# Patient Record
Sex: MALE | Race: BLACK OR AFRICAN AMERICAN | NOT HISPANIC OR LATINO | Employment: OTHER | URBAN - METROPOLITAN AREA
[De-identification: names, ages, dates, MRNs, and addresses within clinical notes are randomized per-mention and may not be internally consistent; named-entity substitution may affect disease eponyms.]

---

## 2017-05-20 ENCOUNTER — OFFICE VISIT (OUTPATIENT)
Dept: INTERNAL MEDICINE | Facility: CLINIC | Age: 42
End: 2017-05-20
Payer: COMMERCIAL

## 2017-05-20 VITALS
WEIGHT: 187.38 LBS | SYSTOLIC BLOOD PRESSURE: 110 MMHG | DIASTOLIC BLOOD PRESSURE: 80 MMHG | BODY MASS INDEX: 26.82 KG/M2 | HEART RATE: 85 BPM | HEIGHT: 70 IN | OXYGEN SATURATION: 98 %

## 2017-05-20 DIAGNOSIS — L03.811 CELLULITIS OF HEAD EXCEPT FACE: Primary | ICD-10-CM

## 2017-05-20 PROCEDURE — 99214 OFFICE O/P EST MOD 30 MIN: CPT | Mod: S$GLB,,, | Performed by: FAMILY MEDICINE

## 2017-05-20 PROCEDURE — 1160F RVW MEDS BY RX/DR IN RCRD: CPT | Mod: S$GLB,,, | Performed by: FAMILY MEDICINE

## 2017-05-20 PROCEDURE — 99999 PR PBB SHADOW E&M-EST. PATIENT-LVL III: CPT | Mod: PBBFAC,,, | Performed by: FAMILY MEDICINE

## 2017-05-20 RX ORDER — MUPIROCIN 20 MG/G
OINTMENT TOPICAL 3 TIMES DAILY
Qty: 30 G | Refills: 3 | Status: SHIPPED | OUTPATIENT
Start: 2017-05-20 | End: 2020-01-09

## 2017-05-20 RX ORDER — DOXYCYCLINE HYCLATE 100 MG
100 TABLET ORAL 2 TIMES DAILY
Qty: 20 TABLET | Refills: 1 | Status: SHIPPED | OUTPATIENT
Start: 2017-05-20 | End: 2017-05-30 | Stop reason: SDUPTHER

## 2017-05-20 NOTE — PROGRESS NOTES
Subjective:       Patient ID: Josh Jimenez Jr. is a 41 y.o. male.    Chief Complaint: Follow-up ([facility] health care)  Josh Jimenez Jr. 41 y.o. male is here for office visit to review care and physical exam, reports having scalp lesions that recur.  Has seen ID, has used Doxycycline which worked.  Had steroid cream prescribed also.  Hard to know if has Seborrheic Derm also.  Has flaking at times, not presently noted.      HPI  Review of Systems   Constitutional: Negative for activity change, appetite change, fatigue, fever and unexpected weight change.   HENT: Negative for congestion, hearing loss, postnasal drip and rhinorrhea.    Eyes: Negative for pain, discharge and visual disturbance.   Respiratory: Negative for cough, choking and shortness of breath.    Cardiovascular: Negative for chest pain, palpitations and leg swelling.   Gastrointestinal: Negative for abdominal pain, diarrhea and vomiting.   Genitourinary: Negative for dysuria, flank pain, hematuria and urgency.   Musculoskeletal: Negative for arthralgias, back pain, joint swelling and neck pain.   Skin: Positive for rash. Negative for color change.   Neurological: Negative for dizziness, tremors, syncope, weakness, numbness and headaches.   Psychiatric/Behavioral: Negative for agitation and confusion. The patient is not hyperactive.        Objective:      Physical Exam   Constitutional: He is oriented to person, place, and time. He appears well-developed and well-nourished.   HENT:   Head: Normocephalic.   Eyes: EOM are normal. Pupils are equal, round, and reactive to light.   Neck: Normal range of motion. Neck supple. No thyromegaly present.   Cardiovascular: Normal rate.  Exam reveals no gallop and no friction rub.    No murmur heard.  Pulmonary/Chest: Effort normal. No respiratory distress. He has no wheezes.   Abdominal: Soft. Bowel sounds are normal. He exhibits no mass. There is no tenderness.   Musculoskeletal: He exhibits no edema or  tenderness.   Lymphadenopathy:     He has no cervical adenopathy.   Neurological: He is alert and oriented to person, place, and time. He has normal reflexes. No cranial nerve deficit.   Skin: Skin is warm. No rash noted.   Papular confluent area scalp with hair loss, slight fluctuance noted.   Psychiatric: He has a normal mood and affect. His behavior is normal.       Assessment:       No diagnosis found.    Plan:       Josh was seen today for follow-up.    Diagnoses and all orders for this visit:    Cellulitis of head except face  -     doxycycline (VIBRA-TABS) 100 MG tablet; Take 1 tablet (100 mg total) by mouth 2 (two) times daily.  -     mupirocin (BACTROBAN) 2 % ointment; Apply topically 3 (three) times daily.

## 2017-05-20 NOTE — MR AVS SNAPSHOT
[facility]  [address]  [facility] [address]  Phone: [phone]  Fax: [phone]                  Josh Jimenez Jr.   2017 8:00 AM   Office Visit    Description:  Male : 1975   Provider:  Fabrice Yang MD   Department:  [facility]           Reason for Visit     Follow-up           Diagnoses this Visit        Comments    Cellulitis of head except face    -  Primary            To Do List           Future Appointments        Provider Department Dept Phone    2017 9:30 AM Kuldip Granados MD [facility] Infectious Diseases [phone]      Goals (5 Years of Data)     None      Follow-Up and Disposition     Return in about 6 months (around 2017), or if symptoms worsen or fail to improve.    Follow-up and Disposition History       These Medications        Disp Refills Start End    doxycycline (VIBRA-TABS) 100 MG tablet 20 tablet 1 2017    Take 1 tablet (100 mg total) by mouth 2 (two) times daily. - Oral    Pharmacy: [address] Ph #: [phone]       mupirocin (BACTROBAN) 2 % ointment 30 g 3 2017     Apply topically 3 (three) times daily. - Topical (Top)    Pharmacy: [address] Ph #: [phone]         [facility]     [facility] Care [facility] -  Assistance  Unless otherwise directed by your provider, please contact [facility], our nurse care line that is available for  assistance.     Registered nurses in the [facility] provide: appointment scheduling, clinical advisement, health education, and other advisory services.  Call: [phone] (toll free)               Medications           Message regarding Medications     Verify the changes and/or additions to your medication regime listed below are the same as discussed with your clinician today.  If any of these changes or additions are incorrect, please  "notify your healthcare provider.        START taking these NEW medications        Refills    doxycycline (VIBRA-TABS) 100 MG tablet 1    Sig: Take 1 tablet (100 mg total) by mouth 2 (two) times daily.    Class: Normal    Route: Oral    mupirocin (BACTROBAN) 2 % ointment 3    Sig: Apply topically 3 (three) times daily.    Class: Normal    Route: Topical (Top)           Verify that the below list of medications is an accurate representation of the medications you are currently taking.  If none reported, the list may be blank. If incorrect, please contact your healthcare provider. Carry this list with you in case of emergency.           Current Medications     clobetasol 0.05% (TEMOVATE) 0.05 % Oint Apply topically 2 (two) times daily.    doxycycline (VIBRA-TABS) 100 MG tablet Take 1 tablet (100 mg total) by mouth 2 (two) times daily.    mupirocin (BACTROBAN) 2 % ointment Apply topically 3 (three) times daily.           Clinical Reference Information           Your Vitals Were     BP Pulse Height Weight SpO2 BMI    110/80 (BP Location: Right arm, Patient Position: Sitting, BP Method: Manual) 85 5' 10" (1.778 m) 85 kg (187 lb 6.3 oz) 98% 26.89 kg/m2      Blood Pressure          Most Recent Value    BP  110/80      Allergies as of 5/20/2017     No Known Allergies      Immunizations Administered on Date of Encounter - 5/20/2017     None      Language Assistance Services     ATTENTION: Language assistance services are available, free of charge. Please call [phone].      ATENCIÓN: Si habla español, tiene a washburn disposición servicios gratuitos de asistencia lingüística. Llame al [phone].     [facility] Ý: N?u b?n nói Ti?ng Vi?t, có các d?ch v? h? tr? ngôn ng? mi?n phí dành cho b?n. G?i s? [phone].         [facility] complies with applicable Federal civil rights laws and does not discriminate on the basis of race, color, national origin, age, disability, or sex.        "

## 2017-05-30 ENCOUNTER — PATIENT MESSAGE (OUTPATIENT)
Dept: INTERNAL MEDICINE | Facility: CLINIC | Age: 42
End: 2017-05-30

## 2017-05-30 DIAGNOSIS — L03.811 CELLULITIS OF HEAD EXCEPT FACE: ICD-10-CM

## 2017-05-30 RX ORDER — DOXYCYCLINE HYCLATE 100 MG
TABLET ORAL
Qty: 20 TABLET | Refills: 0 | Status: SHIPPED | OUTPATIENT
Start: 2017-05-30 | End: 2017-06-22 | Stop reason: SDUPTHER

## 2017-06-21 ENCOUNTER — PATIENT MESSAGE (OUTPATIENT)
Dept: INTERNAL MEDICINE | Facility: CLINIC | Age: 42
End: 2017-06-21

## 2017-06-21 DIAGNOSIS — L03.811 CELLULITIS OF HEAD EXCEPT FACE: ICD-10-CM

## 2017-06-22 RX ORDER — DOXYCYCLINE HYCLATE 100 MG
100 TABLET ORAL 2 TIMES DAILY
Qty: 20 TABLET | Refills: 0 | Status: SHIPPED | OUTPATIENT
Start: 2017-06-22 | End: 2020-01-09

## 2018-11-19 ENCOUNTER — PATIENT MESSAGE (OUTPATIENT)
Dept: INTERNAL MEDICINE | Facility: CLINIC | Age: 43
End: 2018-11-19

## 2020-01-09 ENCOUNTER — OFFICE VISIT (OUTPATIENT)
Dept: INTERNAL MEDICINE | Facility: CLINIC | Age: 45
End: 2020-01-09
Payer: COMMERCIAL

## 2020-01-09 VITALS
OXYGEN SATURATION: 97 % | WEIGHT: 190.25 LBS | HEART RATE: 87 BPM | BODY MASS INDEX: 27.24 KG/M2 | SYSTOLIC BLOOD PRESSURE: 112 MMHG | HEIGHT: 70 IN | DIASTOLIC BLOOD PRESSURE: 78 MMHG

## 2020-01-09 DIAGNOSIS — Z00.00 ANNUAL PHYSICAL EXAM: ICD-10-CM

## 2020-01-09 DIAGNOSIS — M67.431 GANGLION OF RIGHT WRIST: ICD-10-CM

## 2020-01-09 DIAGNOSIS — Z76.89 ENCOUNTER TO ESTABLISH CARE WITH NEW DOCTOR: Primary | ICD-10-CM

## 2020-01-09 DIAGNOSIS — M79.645 THUMB PAIN, LEFT: ICD-10-CM

## 2020-01-09 PROCEDURE — 99999 PR PBB SHADOW E&M-EST. PATIENT-LVL IV: ICD-10-PCS | Mod: PBBFAC,,, | Performed by: FAMILY MEDICINE

## 2020-01-09 PROCEDURE — 99999 PR PBB SHADOW E&M-EST. PATIENT-LVL IV: CPT | Mod: PBBFAC,,, | Performed by: FAMILY MEDICINE

## 2020-01-09 PROCEDURE — 99214 PR OFFICE/OUTPT VISIT, EST, LEVL IV, 30-39 MIN: ICD-10-PCS | Mod: S$GLB,,, | Performed by: FAMILY MEDICINE

## 2020-01-09 PROCEDURE — 3008F BODY MASS INDEX DOCD: CPT | Mod: CPTII,S$GLB,, | Performed by: FAMILY MEDICINE

## 2020-01-09 PROCEDURE — 99214 OFFICE O/P EST MOD 30 MIN: CPT | Mod: S$GLB,,, | Performed by: FAMILY MEDICINE

## 2020-01-09 PROCEDURE — 3008F PR BODY MASS INDEX (BMI) DOCUMENTED: ICD-10-PCS | Mod: CPTII,S$GLB,, | Performed by: FAMILY MEDICINE

## 2020-01-09 NOTE — PROGRESS NOTES
Subjective:       Patient ID: Josh Jimenez Jr. is a 44 y.o. male.    Chief Complaint: Establish Care    HPI    44yoM to [facility] care. Former PCP Dr. Yang.    Tender nodule on patient's right wrist. Initial onset after trauma when patient accidentally jammed wrist into friend's shoulder months prior. Tender nodule on dorsal wrist that bothers him when palpating but also when throwing football and during activity (coaches youth sports). Right hand dominant. Denies other numbness/tingling, joint pain.     Review of Systems   Constitutional: Negative for activity change, fatigue, fever and unexpected weight change.   HENT: Negative for ear pain, hearing loss, rhinorrhea, sinus pain, sore throat and trouble swallowing.    Eyes: Negative for discharge and visual disturbance.   Respiratory: Negative for cough, chest tightness, shortness of breath and wheezing.    Cardiovascular: Negative for chest pain and palpitations.   Gastrointestinal: Negative for abdominal pain, blood in stool, constipation, diarrhea, nausea and vomiting.   Endocrine: Negative for polydipsia and polyuria.   Genitourinary: Negative for difficulty urinating, dysuria, hematuria and urgency.   Musculoskeletal: Positive for arthralgias. Negative for back pain, joint swelling and neck pain.   Skin: Negative for rash.   Neurological: Negative for weakness, numbness and headaches.   Psychiatric/Behavioral: Negative for confusion and dysphoric mood.         History reviewed. No pertinent past medical history.     Prior to Admission medications    Medication Sig Start Date End Date Taking? Authorizing Provider   clobetasol 0.05% (TEMOVATE) 0.05 % Oint Apply topically 2 (two) times daily.  Patient not taking: Reported on 1/9/2020 1/13/16 5/20/17  Kuldip Granados MD   doxycycline (VIBRA-TABS) 100 MG tablet Take 1 tablet (100 mg total) by mouth 2 (two) times daily. 6/22/17   Fabrice Yang MD   mupirocin (BACTROBAN) 2 % ointment Apply topically 3 (three) times  "daily. 5/20/17   Fabrice Yang MD        Past medical history, surgical history, and family medical history reviewed and updated as appropriate.    Medications and allergies reviewed.     Objective:          Vitals:    01/09/20 0902   BP: 112/78   BP Location: Right arm   Patient Position: Sitting   BP Method: Large (Manual)   Pulse: 87   SpO2: 97%   Weight: 86.3 kg (190 lb 4.1 oz)   Height: 5' 10" (1.778 m)     Body mass index is 27.3 kg/m².  Physical Exam   Constitutional: He is oriented to person, place, and time. He appears well-developed and well-nourished.   Eyes: EOM are normal.   Cardiovascular: Normal rate, regular rhythm and normal heart sounds.   No murmur heard.  Pulmonary/Chest: Effort normal and breath sounds normal. No respiratory distress. He has no wheezes.   Abdominal: Soft.   Musculoskeletal:        Right wrist: He exhibits tenderness.   Ganglion cyst, tender nodule, mobile   Neurological: He is alert and oriented to person, place, and time.       Lab Results   Component Value Date    WBC 6.64 03/31/2015    HGB 15.3 03/31/2015    HCT 45.1 03/31/2015     03/31/2015    CHOL 233 (H) 08/21/2016    TRIG 115 08/21/2016    HDL 37 (L) 08/21/2016    ALT 15 08/21/2016    AST 16 08/21/2016     08/21/2016    K 4.6 08/21/2016     08/21/2016    CREATININE 1.2 08/21/2016    BUN 14 08/21/2016    CO2 24 08/21/2016    HGBA1C 5.3 03/31/2015       Assessment:       1. Encounter to establish care with new doctor    2. Annual physical exam    3. Ganglion of right wrist    4. Thumb pain, left        Plan:   Josh was seen today for establish care.    Diagnoses and all orders for this visit:    Discussed several possibility incl conservative vs. More aggressive mgmt. S/t patient's pain with activity, patient wants addressed. Will order u/s today but also referring for further mgmt incl possible injection/aspiration vs. Complete cyst removal. Discussed with pt that if cyst, high possibility of " recurrence with any procedure. nsaids prn pain/inflammation, rtc prn    Encounter to establish care with new doctor    Annual physical exam    Ganglion of right wrist  -     US Soft Tissue Misc; Future  -     Ambulatory referral to Hand Surgery    Thumb pain, left  -     US Soft Tissue Misc; Future  -     Ambulatory referral to Hand Surgery      Follow up in about 1 year (around 1/9/2021).    Rakesh Dent MD  Family Medicine  [facility]  1/9/2020

## 2020-01-16 ENCOUNTER — HOSPITAL ENCOUNTER (OUTPATIENT)
Dept: RADIOLOGY | Facility: OTHER | Age: 45
Discharge: HOME | End: 2020-01-16
Attending: FAMILY MEDICINE
Payer: COMMERCIAL

## 2020-01-16 DIAGNOSIS — M79.645 THUMB PAIN, LEFT: ICD-10-CM

## 2020-01-16 DIAGNOSIS — M67.431 GANGLION OF RIGHT WRIST: ICD-10-CM

## 2020-01-16 PROCEDURE — 76882 PR  US,EXTREMITY,NONVASCULAR,REAL-TIME IMAGE,LIMITED: ICD-10-PCS | Mod: 26,RT,, | Performed by: RADIOLOGY

## 2020-01-16 PROCEDURE — 76999 ECHO EXAMINATION PROCEDURE: CPT | Mod: TC

## 2020-01-16 PROCEDURE — 76882 US LMTD JT/FCL EVL NVASC XTR: CPT | Mod: 26,RT,, | Performed by: RADIOLOGY

## 2020-01-29 ENCOUNTER — OFFICE VISIT (OUTPATIENT)
Dept: ORTHOPEDICS | Facility: CLINIC | Age: 45
End: 2020-01-29
Payer: COMMERCIAL

## 2020-01-29 ENCOUNTER — HOSPITAL ENCOUNTER (OUTPATIENT)
Dept: RADIOLOGY | Facility: OTHER | Age: 45
Discharge: HOME | End: 2020-01-29
Attending: PHYSICIAN ASSISTANT
Payer: COMMERCIAL

## 2020-01-29 VITALS
SYSTOLIC BLOOD PRESSURE: 125 MMHG | BODY MASS INDEX: 27.24 KG/M2 | DIASTOLIC BLOOD PRESSURE: 87 MMHG | HEIGHT: 70 IN | WEIGHT: 190.25 LBS | HEART RATE: 76 BPM

## 2020-01-29 DIAGNOSIS — M67.431 GANGLION CYST OF DORSUM OF RIGHT WRIST: ICD-10-CM

## 2020-01-29 DIAGNOSIS — R20.0 BILATERAL HAND NUMBNESS: ICD-10-CM

## 2020-01-29 DIAGNOSIS — M25.562 PAIN IN BOTH KNEES, UNSPECIFIED CHRONICITY: ICD-10-CM

## 2020-01-29 DIAGNOSIS — M25.561 PAIN IN BOTH KNEES, UNSPECIFIED CHRONICITY: ICD-10-CM

## 2020-01-29 DIAGNOSIS — M67.431 GANGLION CYST OF DORSUM OF RIGHT WRIST: Primary | ICD-10-CM

## 2020-01-29 PROCEDURE — 99999 PR PBB SHADOW E&M-EST. PATIENT-LVL III: ICD-10-PCS | Mod: PBBFAC,,, | Performed by: PHYSICIAN ASSISTANT

## 2020-01-29 PROCEDURE — 99204 OFFICE O/P NEW MOD 45 MIN: CPT | Mod: S$GLB,,, | Performed by: PHYSICIAN ASSISTANT

## 2020-01-29 PROCEDURE — 73130 X-RAY EXAM OF HAND: CPT | Mod: TC,50,FY

## 2020-01-29 PROCEDURE — 73130 X-RAY EXAM OF HAND: CPT | Mod: 26,,, | Performed by: RADIOLOGY

## 2020-01-29 PROCEDURE — 73130 XR HAND COMPLETE 3 VIEWS BILATERAL: ICD-10-PCS | Mod: 26,,, | Performed by: RADIOLOGY

## 2020-01-29 PROCEDURE — 99204 PR OFFICE/OUTPT VISIT, NEW, LEVL IV, 45-59 MIN: ICD-10-PCS | Mod: S$GLB,,, | Performed by: PHYSICIAN ASSISTANT

## 2020-01-29 PROCEDURE — 3008F PR BODY MASS INDEX (BMI) DOCUMENTED: ICD-10-PCS | Mod: CPTII,S$GLB,, | Performed by: PHYSICIAN ASSISTANT

## 2020-01-29 PROCEDURE — 99999 PR PBB SHADOW E&M-EST. PATIENT-LVL III: CPT | Mod: PBBFAC,,, | Performed by: PHYSICIAN ASSISTANT

## 2020-01-29 PROCEDURE — 3008F BODY MASS INDEX DOCD: CPT | Mod: CPTII,S$GLB,, | Performed by: PHYSICIAN ASSISTANT

## 2020-01-29 NOTE — LETTER
January 29, 2020      Rakesh Dent MD  [address] [facility] [address]           [facility] [address] [address]  [address], [address]  [facility] [address]  Phone: [phone]          Patient: Josh Jimenez Jr.   MR Number: [id number]   YOB: 1975   Date of Visit: 1/29/2020       Dear Dr. Rakesh Dent:    Thank you for referring Josh Jimenez to me for evaluation. Attached you will find relevant portions of my assessment and plan of care.    If you have questions, please do not hesitate to call me. I look forward to following Josh Jimenez along with you.    Sincerely,    Adrianne Rome PA-C    Enclosure  CC:  No Recipients    If you would like to receive this communication electronically, please contact [email or web address] or [phone] to request more information on [facility] access.    For providers and/or their staff who would like to refer a patient to [facility], please contact us through our one-stop-shop provider referral line, [facility], at [phone].    If you feel you have received this communication in error or would no longer like to receive these types of communications, please e-mail [email or web address]

## 2020-01-29 NOTE — PROGRESS NOTES
"Subjective:      Patient ID: Josh Jimenez Jr. is a 44 y.o. male.    Chief Complaint: Pain and Swelling of the Right Wrist      HPI  Josh Jimenez Jr. is a  44 y.o. male presenting today referred by Dr. Dent for right wrist ganglion cyst. He reports onset about 15 years ago. Mass is located over the dorsum of the right wrist. He reports it is sometimes bothersome especially with use. He did have a recent US of the mass which showed three ganglion cysts in the area. He also reports about one year of intermittent numbness in tingling in bl hands, worse on the right. This is also bothersome. He denies night time symptoms. Denies prior surgery on either hand or attempted aspiration/ injection of the cyst.      Review of patient's allergies indicates:  No Known Allergies      No current outpatient medications on file.     No current facility-administered medications for this visit.        No past medical history on file.    No past surgical history on file.    Review of Systems:  Constitutional: Negative for chills and fever.   Respiratory: Negative for cough and shortness of breath.    Gastrointestinal: Negative for nausea and vomiting.   Skin: Negative for rash.   Neurological: Negative for dizziness and headaches.   Psychiatric/Behavioral: Negative for depression.   MSK as in HPI       OBJECTIVE:     PHYSICAL EXAM:  /87 (BP Location: Left arm, Patient Position: Sitting, BP Method: Large (Automatic))   Pulse 76   Ht 5' 10" (1.778 m)   Wt 86.3 kg (190 lb 4.1 oz)   BMI 27.30 kg/m²     GEN:  NAD, well-developed, well-groomed.  NEURO: Awake, alert, and oriented. Normal attention and concentration.    PSYCH: Normal mood and affect. Behavior is normal.  HEENT: No cervical lymphadenopathy noted.  CARDIOVASCULAR: Radial pulses 2+ bilaterally. No LE edema noted.  PULMONARY: Breath sounds normal. No respiratory distress.  SKIN: Intact, no rashes.      MSK:   BUE:  Good active ROM of the wrist and fingers. There is a mass " over the dorsum of the right wrist, over the radial aspect. it is about 2-3 cm in diameter, consistent with a ganglion cyst, mild tenderness. He has positive tinels over bl carpal tunnel, negative durkans. Negative tinels at bl elbows. AIN/PIN/Radial/Median/Ulnar Nerves assessed in isolation without deficit. Radial & Ulnar arteries palpated 2+. Capillary Refill <3s.    RADIOGRAPHS:  US right wrist 1/16/20  Impression     Three separate cystic lesions along the dorsum of the wrist in the region of the patient's palpable lump suggestive of multiple small ganglion cysts.   Comments: I have personally reviewed the imaging and I agree with the above radiologist's report.    ASSESSMENT/PLAN:       ICD-10-CM ICD-9-CM   1. Ganglion cyst of dorsum of right wrist M67.431 727.41   2. Bilateral hand numbness R20.0 782.0   3. Pain in both knees, unspecified chronicity M25.561 719.46    M25.562        Orders Placed This Encounter    X-Ray Hand 3 View Bilateral    Ambulatory referral/consult to Orthopedics    EMG W/ ULTRASOUND AND NERVE CONDUCTION TEST 2 Extremities     Plan:   -Discussed treatment options including injection/ aspiration of the cyst vs surgical excision. Discussed numbness and tingling is likely unrelated to the cyst and he likely has carpal tunnel. We will obtain an EMG. We will hold off on cyst treatment until after EMG, may consider cyst excision and carpal tunnel release.   -RTC following EMG      The patient indicates understanding of these issues and agrees to the plan.    Adrianne Rome PA-C  Hand Clinic   [facility] [place], [place]

## 2020-01-30 ENCOUNTER — TELEPHONE (OUTPATIENT)
Dept: ORTHOPEDICS | Facility: CLINIC | Age: 45
End: 2020-01-30

## 2020-02-04 ENCOUNTER — PROCEDURE VISIT (OUTPATIENT)
Dept: PHYSICAL MEDICINE AND REHAB | Facility: CLINIC | Age: 45
End: 2020-02-04
Payer: COMMERCIAL

## 2020-02-04 ENCOUNTER — TELEPHONE (OUTPATIENT)
Dept: ORTHOPEDICS | Facility: CLINIC | Age: 45
End: 2020-02-04

## 2020-02-04 DIAGNOSIS — R20.0 BILATERAL HAND NUMBNESS: ICD-10-CM

## 2020-02-04 PROCEDURE — 95886 MUSC TEST DONE W/N TEST COMP: CPT | Mod: S$GLB,,, | Performed by: PHYSICAL MEDICINE & REHABILITATION

## 2020-02-04 PROCEDURE — 95910 PR NERVE CONDUCTION STUDY; 7-8 STUDIES: ICD-10-PCS | Mod: S$GLB,,, | Performed by: PHYSICAL MEDICINE & REHABILITATION

## 2020-02-04 PROCEDURE — 95885 MUSC TST DONE W/NERV TST LIM: CPT | Mod: 59,S$GLB,, | Performed by: PHYSICAL MEDICINE & REHABILITATION

## 2020-02-04 PROCEDURE — 95885 PR MUSC TST DONE W/NERV TST LIM: ICD-10-PCS | Mod: 59,S$GLB,, | Performed by: PHYSICAL MEDICINE & REHABILITATION

## 2020-02-04 PROCEDURE — 95910 NRV CNDJ TEST 7-8 STUDIES: CPT | Mod: S$GLB,,, | Performed by: PHYSICAL MEDICINE & REHABILITATION

## 2020-02-04 PROCEDURE — 95886 PR EMG COMPLETE, W/ NERVE CONDUCTION STUDIES, 5+ MUSCLES: ICD-10-PCS | Mod: S$GLB,,, | Performed by: PHYSICAL MEDICINE & REHABILITATION

## 2020-02-04 NOTE — TELEPHONE ENCOUNTER
Spoke with pt.   States he needs to reschedule his appointment for bilateral knee pain for tomorrow.   Pt will call back to schedule with a joint provider at a later time

## 2020-02-04 NOTE — PROCEDURES
Test Date:  2020    Patient: Josh Jimenez : 1975 Physician: Raj Germain D.O.   ID#:  SEX: Male Ref. Phys: Adrianne Rome PA-C     HPI: Josh Jimenez Jr. is a 44 y.o.male who presents for NCS/EMG to evaluate for CTS bilaterally.  Right hand more symptomatic than the left.      NCV & EMG Findings:   Evaluation of the left median sensory and the right median sensory nerves showed prolonged distal peak latency and decreased conduction velocity.   All remaining nerves (as indicated in the following tables) were within normal limits.   Needle evaluation of the right Abductor Pollicis Brevis muscle showed increased insertional activity, slightly increased spontaneous activity, and diminished recruitment.   All remaining muscles (as indicated in the following table) showed no evidence of electrical instability.    Impression:  There is evidence of a bilateral sensory median mononeuropathy across the wrists (I.e. Carpal tunnel syndrome).  There is no motor nerve involvement on NCS on either side.  There is active denervation on the right, not on the left.  The left is graded as mild.  The right side grading is more complicated as the NCS would point towards mild, but the changes in the APB muscle on needle EMG would point towards severe.  For this reason, will take the average and call this moderate on the right.      ___________________________  Raj Germain D.O.        NCS+  Motor Nerve Results      Latency Amplitude F-Lat Segment Distance CV Comment   Site (ms) Norm (mV) Norm (ms)  (cm) (m/s) Norm    Left Median (APB)   Wrist 4.3  < 4.6 6.7  > 4.2  Wrist-Palm - - -    Elbow 8.9 - 5.5 -  Elbow-Wrist 24 52  > 47    Right Median (APB)   Wrist 4.1  < 4.6 8.0  > 4.2  Wrist-Palm - - -    Elbow 8.2 - 7.7 -  Elbow-Wrist 25 61  > 47    Left Ulnar (ADM)   Wrist 2.6  < 3.7 6.4  > 3.0         Bel Elbow 6.4 - 5.9 -  Bel Elbow-Wrist 31 82  > 52    Right Ulnar (ADM)   Wrist 2.2  < 3.7 4.4  > 3.0          Bel Elbow 5.9 - 4.3 -  Bel Elbow-Wrist 24 65  > 52    Abv Elbow 7.6 - 4.1 -  Abv Elbow-Bel Elbow 14 82  > 43      Sensory Nerve Results      Latency (Peak) Amplitude (P-P) Segment Distance CV Comment   Site (ms) Norm (µV) Norm  (cm) (m/s) Norm    Left Median (Stim:Wrist)   Dig II *4.2  < 4.0 33  > 13 Wrist-Dig II 14 *33  > 39    Right Median (Stim:Wrist)   Dig II *4.2  < 4.0 26  > 13 Wrist-Dig II 14 *33  > 39    Left Ulnar (Stim:Wrist)   Dig V 3.4  < 4.0 63  > 8 Wrist-Dig V 14 41  > 38    Right Ulnar (Stim:Wrist)   Dig V 3.2  < 4.0 60  > 8 Wrist-Dig V 14 44  > 38      EMG+     Side Muscle Nerve Root Ins Act Fibs Psw Amp Dur Poly Recrt Int Pat Comment   Right Biceps Musculocut C5-C6 Nml Nml Nml Nml Nml 0 Nml Nml    Right Triceps Radial C6-C8 Nml Nml Nml Nml Nml 0 Nml Nml    Right Brachiorad Radial C5-C6 Nml Nml Nml Nml Nml 0 Nml Nml    Right FDI Ulnar C8-T1 Nml Nml Nml Nml Nml 0 Nml Nml    Right APB Median C8-T1 *Incr *1+ *1+ Nml Nml 0 *Reduced Nml    Left APB Median C8-T1 Nml Nml Nml Nml Nml 0 Nml Nml            Waveforms:    Motor              Sensory

## 2020-02-12 ENCOUNTER — TELEPHONE (OUTPATIENT)
Dept: ORTHOPEDICS | Facility: CLINIC | Age: 45
End: 2020-02-12

## 2020-02-13 ENCOUNTER — OFFICE VISIT (OUTPATIENT)
Dept: ORTHOPEDICS | Facility: CLINIC | Age: 45
End: 2020-02-13
Payer: COMMERCIAL

## 2020-02-13 VITALS
BODY MASS INDEX: 27.2 KG/M2 | HEIGHT: 70 IN | SYSTOLIC BLOOD PRESSURE: 138 MMHG | DIASTOLIC BLOOD PRESSURE: 81 MMHG | WEIGHT: 190 LBS | HEART RATE: 71 BPM

## 2020-02-13 DIAGNOSIS — G56.03 CARPAL TUNNEL SYNDROME, BILATERAL: ICD-10-CM

## 2020-02-13 DIAGNOSIS — M25.642 DECREASED RANGE OF MOTION OF FINGER OF LEFT HAND: ICD-10-CM

## 2020-02-13 DIAGNOSIS — R22.31 FINGER MASS, RIGHT: ICD-10-CM

## 2020-02-13 DIAGNOSIS — M67.431 GANGLION CYST OF DORSUM OF RIGHT WRIST: Primary | ICD-10-CM

## 2020-02-13 PROCEDURE — 3008F PR BODY MASS INDEX (BMI) DOCUMENTED: ICD-10-PCS | Mod: CPTII,S$GLB,, | Performed by: ORTHOPAEDIC SURGERY

## 2020-02-13 PROCEDURE — 99214 OFFICE O/P EST MOD 30 MIN: CPT | Mod: S$GLB,,, | Performed by: ORTHOPAEDIC SURGERY

## 2020-02-13 PROCEDURE — 99999 PR PBB SHADOW E&M-EST. PATIENT-LVL III: CPT | Mod: PBBFAC,,, | Performed by: ORTHOPAEDIC SURGERY

## 2020-02-13 PROCEDURE — 99999 PR PBB SHADOW E&M-EST. PATIENT-LVL III: ICD-10-PCS | Mod: PBBFAC,,, | Performed by: ORTHOPAEDIC SURGERY

## 2020-02-13 PROCEDURE — 99214 PR OFFICE/OUTPT VISIT, EST, LEVL IV, 30-39 MIN: ICD-10-PCS | Mod: S$GLB,,, | Performed by: ORTHOPAEDIC SURGERY

## 2020-02-13 PROCEDURE — 3008F BODY MASS INDEX DOCD: CPT | Mod: CPTII,S$GLB,, | Performed by: ORTHOPAEDIC SURGERY

## 2020-02-13 NOTE — PROGRESS NOTES
"Subjective:      Patient ID: Josh Jimenez Jr. is a 44 y.o. male.    Chief Complaint: Pain of the Left Wrist and Pain of the Right Wrist      HPI  Josh Jimenez Jr. is a  44 y.o. male presenting today referred by Dr. eDnt for right wrist ganglion cyst. He reports onset about 15 years ago. Mass is located over the dorsum of the right wrist. He reports it is sometimes bothersome especially with use. He did have a recent US of the mass which showed three ganglion cysts in the area. He also reports about one year of intermittent numbness in tingling in bl hands, worse on the right. This is also bothersome. He denies night time symptoms. Denies prior surgery on either hand or attempted aspiration/ injection of the cyst.    2/13/20  Pt presents for follow up, EMG results. Symptoms remain stable. He also mentions a nodules at the distal right long finger which he in having excised. In addition, he mentions decreased full extension of the left thumb, this is chronic.     Review of patient's allergies indicates:  No Known Allergies      No current outpatient medications on file.     No current facility-administered medications for this visit.        History reviewed. No pertinent past medical history.    History reviewed. No pertinent surgical history.    Review of Systems:  Constitutional: Negative for chills and fever.   Respiratory: Negative for cough and shortness of breath.    Gastrointestinal: Negative for nausea and vomiting.   Skin: Negative for rash.   Neurological: Negative for dizziness and headaches.   Psychiatric/Behavioral: Negative for depression.   MSK as in HPI       OBJECTIVE:     PHYSICAL EXAM:  /81   Pulse 71   Ht 5' 10" (1.778 m)   Wt 86.2 kg (190 lb)   BMI 27.26 kg/m²     GEN:  NAD, well-developed, well-groomed.  NEURO: Awake, alert, and oriented. Normal attention and concentration.    PSYCH: Normal mood and affect. Behavior is normal.  HEENT: No cervical lymphadenopathy noted.  CARDIOVASCULAR: " Radial pulses 2+ bilaterally. No LE edema noted.  PULMONARY: Breath sounds normal. No respiratory distress.  SKIN: Intact, no rashes.      MSK:   BUE:  Good active ROM of the wrist and fingers. There is a mass over the dorsum of the right wrist, over the radial aspect. it is about 2-3 cm in diameter, consistent with a ganglion cyst, mild tenderness. There is a small 2mm mass at the distal tip of the long finger which appears consistent with an epidermal inclusion cyst, mild tenderness. He has positive tinels over bl carpal tunnel, negative durkans. Negative tinels at bl elbows. AIN/PIN/Radial/Median/Ulnar Nerves assessed in isolation without deficit. Radial & Ulnar arteries palpated 2+. Capillary Refill <3s.    RADIOGRAPHS:  US right wrist 1/16/20  Impression     Three separate cystic lesions along the dorsum of the wrist in the region of the patient's palpable lump suggestive of multiple small ganglion cysts.   Comments: I have personally reviewed the imaging and I agree with the above radiologist's report.    ASSESSMENT/PLAN:       ICD-10-CM ICD-9-CM   1. Ganglion cyst of dorsum of right wrist M67.431 727.41   2. Carpal tunnel syndrome, bilateral G56.03 354.0   3. Finger mass, right R22.31 782.2   4. Decreased range of motion of finger of left hand M25.642 719.54       Orders Placed This Encounter    X-Ray Finger 2 View or More Views     Plan:   -Discussed treatment options. Pt wishes to proceed with right dorsal ganglion excision, right carpal tunnel release, and excision of the right long finger mass. Consents reviewed and signed. All questions answered.   -we will obtain xrays of the left thumb today   -RTC post op       The patient indicates understanding of these issues and agrees to the plan.    Adrianne Rome PA-C  Hand Clinic   [facility]  [place], [place]

## 2020-02-14 NOTE — PROGRESS NOTES
I have personally taken the history and examined the patient. I agree with the Hand Surgery PA's note. The plan will be :  Plan:   -Discussed treatment options. Pt wishes to proceed with right dorsal ganglion excision, right carpal tunnel release, and excision of the right long finger mass. Consents reviewed and signed. All questions answered.   -we will obtain xrays of the left thumb today   -RTC post op       Knee Pain   WHAT YOU NEED TO KNOW:   Knee pain may start suddenly, or it may be a long-term problem  You may have pain on the side, front, or back of your knee  You may have knee stiffness and swelling  You may hear popping sounds or feel like your knee is giving way or locking up as you walk  You may feel pain when you sit, stand, walk, or climb up and down stairs  Knee pain can be caused by conditions such as obesity, inflammation, or strains or tears in ligaments or tendons  DISCHARGE INSTRUCTIONS:   Follow up with your healthcare provider within 24 hours or as directed: You may need follow-up treatments, such as steroid injections to decrease pain  Write down your questions so you remember to ask them during your visits  Self-care:   · Rest  your knee so it can heal  Limit activities that increase your pain  · Ice  can help reduce swelling  Wrap ice in a towel and put it on your knee for as long and as often as directed  · Compression  with a brace or bandage can help reduce swelling  Use a brace or bandage only as directed  · Elevation  helps decrease pain and swelling  Elevate your knee while you are sitting or lying down  Prop your leg on pillows to keep your knee above the level of your heart  Medicines:   · NSAIDs  help decrease swelling and pain or fever  This medicine is available with or without a doctor's order  NSAIDs can cause stomach bleeding or kidney problems in certain people  If you take blood thinner medicine, always ask your healthcare provider if NSAIDs are safe for you  Always read the medicine label and follow directions  · Acetaminophen  decreases pain and fever  It is available without a doctor's order  Ask how much to take and when to take it  Follow directions  Acetaminophen can cause liver damage if not taken correctly  · Take your medicine as directed  Contact your healthcare provider if you think your medicine is not helping or if you have side effects   Tell him or her if you are allergic to any medicine  Keep a list of the medicines, vitamins, and herbs you take  Include the amounts, and when and why you take them  Bring the list or the pill bottles to follow-up visits  Carry your medicine list with you in case of an emergency  Exercise as directed: You may need to see a physical therapist or do recommended exercises to improve movement and decrease your pain  You may be directed to walk, swim, or ride a bike  Follow your exercise plan exactly as directed to avoid further injury  Contact your healthcare provider if:   · You have questions or concerns about your condition or care  Return to the emergency department if:   · Your pain is worse, even after treatment  · You cannot bend or straighten your leg completely  · The swelling around your knee does not go down even with treatment  · Your knee is painful and hot to the touch  © 2017 [address] Information is for End User's use only and may not be sold, redistributed or otherwise used for commercial purposes  All illustrations and images included in CareNotes® are the copyrighted property of [employer]  or Thiago Barnes  The above information is an  only  It is not intended as medical advice for individual conditions or treatments  Talk to your doctor, nurse or pharmacist before following any medical regimen to see if it is safe and effective for you

## 2020-02-18 DIAGNOSIS — R22.31 MASS OF FINGER OF RIGHT HAND: Primary | ICD-10-CM

## 2020-02-24 ENCOUNTER — TELEPHONE (OUTPATIENT)
Dept: ORTHOPEDICS | Facility: CLINIC | Age: 45
End: 2020-02-24

## 2020-02-24 NOTE — TELEPHONE ENCOUNTER
Spoke c pt's wife, Rena. Informed her that we will remove pt from 30/02/20 surgery scheduled. He we will call to r/s at a later date. Pt's wife expressed understanding & was thankful.

## 2020-02-24 NOTE — TELEPHONE ENCOUNTER
----- Message from Huong Camp sent at 2/24/2020 11:31 AM CST -----  Contact: MADHU SUTHERLAND JR. [[id number]]  Type: Patient Call Back    Who called:MADHU SUTHERLAND JR. [[id number]]    What is the request in detail:  Patient states that she would like to cancel procedure on 03/02/20. She would call back when she would like to reschedule.   Please advise.    Can the clinic reply by [facility]? No    Best call back number: [phone]    Additional Information: N/A

## 2020-06-17 ENCOUNTER — LAB VISIT (OUTPATIENT)
Dept: LAB | Facility: HOSPITAL | Age: 45
End: 2020-06-17
Attending: FAMILY MEDICINE
Payer: COMMERCIAL

## 2020-06-17 DIAGNOSIS — Z01.84 ANTIBODY RESPONSE EXAM: ICD-10-CM

## 2020-06-17 PROCEDURE — 86769 SARS-COV-2 COVID-19 ANTIBODY: CPT

## 2020-06-17 PROCEDURE — 36415 COLL VENOUS BLD VENIPUNCTURE: CPT

## 2020-06-18 LAB — SARS-COV-2 IGG SERPLBLD QL IA.RAPID: NEGATIVE

## 2020-09-24 ENCOUNTER — TELEPHONE (OUTPATIENT)
Dept: ORTHOPEDICS | Facility: CLINIC | Age: 45
End: 2020-09-24

## 2021-04-05 ENCOUNTER — PATIENT MESSAGE (OUTPATIENT)
Dept: ADMINISTRATIVE | Facility: HOSPITAL | Age: 46
End: 2021-04-05

## 2021-04-26 ENCOUNTER — PATIENT MESSAGE (OUTPATIENT)
Dept: RESEARCH | Facility: HOSPITAL | Age: 46
End: 2021-04-26

## 2021-07-07 ENCOUNTER — PATIENT MESSAGE (OUTPATIENT)
Dept: ADMINISTRATIVE | Facility: HOSPITAL | Age: 46
End: 2021-07-07

## 2021-10-04 ENCOUNTER — PATIENT MESSAGE (OUTPATIENT)
Dept: ADMINISTRATIVE | Facility: HOSPITAL | Age: 46
End: 2021-10-04

## 2022-01-26 ENCOUNTER — PATIENT MESSAGE (OUTPATIENT)
Dept: ADMINISTRATIVE | Facility: HOSPITAL | Age: 47
End: 2022-01-26
Payer: COMMERCIAL

## 2022-03-16 ENCOUNTER — PATIENT MESSAGE (OUTPATIENT)
Dept: ADMINISTRATIVE | Facility: HOSPITAL | Age: 47
End: 2022-03-16
Payer: COMMERCIAL

## 2022-12-23 ENCOUNTER — LAB VISIT (OUTPATIENT)
Dept: LAB | Facility: HOSPITAL | Age: 47
End: 2022-12-23
Attending: FAMILY MEDICINE
Payer: COMMERCIAL

## 2022-12-23 ENCOUNTER — OFFICE VISIT (OUTPATIENT)
Dept: INTERNAL MEDICINE | Facility: CLINIC | Age: 47
End: 2022-12-23
Payer: COMMERCIAL

## 2022-12-23 VITALS
HEIGHT: 70 IN | HEART RATE: 81 BPM | OXYGEN SATURATION: 98 % | WEIGHT: 196.63 LBS | SYSTOLIC BLOOD PRESSURE: 122 MMHG | BODY MASS INDEX: 28.15 KG/M2 | DIASTOLIC BLOOD PRESSURE: 78 MMHG

## 2022-12-23 DIAGNOSIS — Z12.5 PROSTATE CANCER SCREENING: ICD-10-CM

## 2022-12-23 DIAGNOSIS — R97.20 ELEVATED PSA, LESS THAN 10 NG/ML: ICD-10-CM

## 2022-12-23 DIAGNOSIS — Z80.42 FAMILY HISTORY OF PROSTATE CANCER: Primary | ICD-10-CM

## 2022-12-23 DIAGNOSIS — M79.645 CHRONIC THUMB PAIN, LEFT: ICD-10-CM

## 2022-12-23 DIAGNOSIS — Z91.89 FRAMINGHAM CARDIAC RISK <10% IN NEXT 10 YEARS: ICD-10-CM

## 2022-12-23 DIAGNOSIS — G89.29 CHRONIC THUMB PAIN, LEFT: ICD-10-CM

## 2022-12-23 DIAGNOSIS — Z00.00 ANNUAL PHYSICAL EXAM: ICD-10-CM

## 2022-12-23 DIAGNOSIS — Z11.59 NEED FOR HEPATITIS C SCREENING TEST: ICD-10-CM

## 2022-12-23 DIAGNOSIS — Z80.42 FAMILY HISTORY OF PROSTATE CANCER: ICD-10-CM

## 2022-12-23 DIAGNOSIS — R07.9 LEFT-SIDED CHEST PAIN: ICD-10-CM

## 2022-12-23 DIAGNOSIS — R10.31 RIGHT GROIN PAIN: ICD-10-CM

## 2022-12-23 DIAGNOSIS — E66.3 OVERWEIGHT (BMI 25.0-29.9): ICD-10-CM

## 2022-12-23 DIAGNOSIS — E78.2 MIXED HYPERLIPIDEMIA: ICD-10-CM

## 2022-12-23 DIAGNOSIS — Z00.00 ANNUAL PHYSICAL EXAM: Primary | ICD-10-CM

## 2022-12-23 LAB
ALBUMIN SERPL BCP-MCNC: 4.1 G/DL (ref 3.5–5.2)
ALP SERPL-CCNC: 66 U/L (ref 55–135)
ALT SERPL W/O P-5'-P-CCNC: 21 U/L (ref 10–44)
ANION GAP SERPL CALC-SCNC: 7 MMOL/L (ref 8–16)
AST SERPL-CCNC: 15 U/L (ref 10–40)
BILIRUB SERPL-MCNC: 1.2 MG/DL (ref 0.1–1)
BUN SERPL-MCNC: 16 MG/DL (ref 6–20)
CALCIUM SERPL-MCNC: 9.4 MG/DL (ref 8.7–10.5)
CHLORIDE SERPL-SCNC: 105 MMOL/L (ref 95–110)
CHOLEST SERPL-MCNC: 240 MG/DL (ref 120–199)
CHOLEST/HDLC SERPL: 6.3 {RATIO} (ref 2–5)
CO2 SERPL-SCNC: 28 MMOL/L (ref 23–29)
COMPLEXED PSA SERPL-MCNC: 4.1 NG/ML (ref 0–4)
CREAT SERPL-MCNC: 1.1 MG/DL (ref 0.5–1.4)
ERYTHROCYTE [DISTWIDTH] IN BLOOD BY AUTOMATED COUNT: 13.6 % (ref 11.5–14.5)
EST. GFR  (NO RACE VARIABLE): >60 ML/MIN/1.73 M^2
ESTIMATED AVG GLUCOSE: 105 MG/DL (ref 68–131)
GLUCOSE SERPL-MCNC: 94 MG/DL (ref 70–110)
HBA1C MFR BLD: 5.3 % (ref 4–5.6)
HCT VFR BLD AUTO: 47.6 % (ref 40–54)
HCV AB SERPL QL IA: NORMAL
HDLC SERPL-MCNC: 38 MG/DL (ref 40–75)
HDLC SERPL: 15.8 % (ref 20–50)
HGB BLD-MCNC: 15.5 G/DL (ref 14–18)
LDLC SERPL CALC-MCNC: 179 MG/DL (ref 63–159)
MCH RBC QN AUTO: 26.3 PG (ref 27–31)
MCHC RBC AUTO-ENTMCNC: 32.6 G/DL (ref 32–36)
MCV RBC AUTO: 81 FL (ref 82–98)
NONHDLC SERPL-MCNC: 202 MG/DL
PLATELET # BLD AUTO: 222 K/UL (ref 150–450)
PMV BLD AUTO: 12.2 FL (ref 9.2–12.9)
POTASSIUM SERPL-SCNC: 4.3 MMOL/L (ref 3.5–5.1)
PROT SERPL-MCNC: 7.7 G/DL (ref 6–8.4)
RBC # BLD AUTO: 5.9 M/UL (ref 4.6–6.2)
SODIUM SERPL-SCNC: 140 MMOL/L (ref 136–145)
TRIGL SERPL-MCNC: 115 MG/DL (ref 30–150)
TSH SERPL DL<=0.005 MIU/L-ACNC: 0.76 UIU/ML (ref 0.4–4)
WBC # BLD AUTO: 6.17 K/UL (ref 3.9–12.7)

## 2022-12-23 PROCEDURE — 93010 ELECTROCARDIOGRAM REPORT: CPT | Mod: S$GLB,,, | Performed by: INTERNAL MEDICINE

## 2022-12-23 PROCEDURE — 93010 EKG 12-LEAD: ICD-10-PCS | Mod: S$GLB,,, | Performed by: INTERNAL MEDICINE

## 2022-12-23 PROCEDURE — 84443 ASSAY THYROID STIM HORMONE: CPT | Performed by: FAMILY MEDICINE

## 2022-12-23 PROCEDURE — 93005 EKG 12-LEAD: ICD-10-PCS | Mod: S$GLB,,, | Performed by: FAMILY MEDICINE

## 2022-12-23 PROCEDURE — 99214 PR OFFICE/OUTPT VISIT, EST, LEVL IV, 30-39 MIN: ICD-10-PCS | Mod: 25,S$GLB,, | Performed by: FAMILY MEDICINE

## 2022-12-23 PROCEDURE — 85027 COMPLETE CBC AUTOMATED: CPT | Performed by: FAMILY MEDICINE

## 2022-12-23 PROCEDURE — 99396 PR PREVENTIVE VISIT,EST,40-64: ICD-10-PCS | Mod: S$GLB,,, | Performed by: FAMILY MEDICINE

## 2022-12-23 PROCEDURE — 86803 HEPATITIS C AB TEST: CPT | Performed by: FAMILY MEDICINE

## 2022-12-23 PROCEDURE — 99999 PR PBB SHADOW E&M-EST. PATIENT-LVL IV: ICD-10-PCS | Mod: PBBFAC,,, | Performed by: FAMILY MEDICINE

## 2022-12-23 PROCEDURE — 36415 COLL VENOUS BLD VENIPUNCTURE: CPT | Performed by: FAMILY MEDICINE

## 2022-12-23 PROCEDURE — 83036 HEMOGLOBIN GLYCOSYLATED A1C: CPT | Performed by: FAMILY MEDICINE

## 2022-12-23 PROCEDURE — 93005 ELECTROCARDIOGRAM TRACING: CPT | Mod: S$GLB,,, | Performed by: FAMILY MEDICINE

## 2022-12-23 PROCEDURE — 84153 ASSAY OF PSA TOTAL: CPT | Performed by: FAMILY MEDICINE

## 2022-12-23 PROCEDURE — 80053 COMPREHEN METABOLIC PANEL: CPT | Performed by: FAMILY MEDICINE

## 2022-12-23 PROCEDURE — 99999 PR PBB SHADOW E&M-EST. PATIENT-LVL IV: CPT | Mod: PBBFAC,,, | Performed by: FAMILY MEDICINE

## 2022-12-23 PROCEDURE — 99396 PREV VISIT EST AGE 40-64: CPT | Mod: S$GLB,,, | Performed by: FAMILY MEDICINE

## 2022-12-23 PROCEDURE — 99214 OFFICE O/P EST MOD 30 MIN: CPT | Mod: 25,S$GLB,, | Performed by: FAMILY MEDICINE

## 2022-12-23 PROCEDURE — 80061 LIPID PANEL: CPT | Performed by: FAMILY MEDICINE

## 2022-12-23 NOTE — PROGRESS NOTES
"Subjective:       Patient ID: Josh Jimenez Jr. is a 46 y.o. male.    Chief Complaint: Arm Pain (Left arm ) and Chest Pain    HPI    Josh Jimenez Jr. is a 46 y.o. male for annual and prob focused visit.    Left sided chest pain x 2wks. Pulling sensation. Can feel with walking, can feel with reaching/bending over.   No n/v, diaphoresis. Tolerating po. Has felt more gassy in last couple weeks.   Not feeling currently in office here while sitting    Left thumb pain, difficult extension over the past year    Right groin pain, bulging. Feels asad with straining, bending, lifting.     #BMI 28    Review of Systems   Constitutional:  Negative for diaphoresis and fever.   Respiratory:  Negative for shortness of breath.    Cardiovascular:  Positive for chest pain.   Gastrointestinal:  Positive for abdominal pain. Negative for constipation, diarrhea, nausea and vomiting.   Genitourinary:  Positive for scrotal swelling. Negative for difficulty urinating.   Musculoskeletal:  Positive for arthralgias.       History reviewed. No pertinent past medical history.     Prior to Admission medications    Not on File        Past medical history, surgical history, and family medical history reviewed and updated as appropriate.    Medications and allergies reviewed.     Objective:          Vitals:    12/23/22 0844   BP: 122/78   BP Location: Right arm   Patient Position: Sitting   Pulse: 81   SpO2: 98%   Weight: 89.2 kg (196 lb 10.4 oz)   Height: 5' 10" (1.778 m)     Body mass index is 28.22 kg/m².  Physical Exam  Vitals and nursing note reviewed.   Constitutional:       General: He is not in acute distress.     Appearance: He is not ill-appearing.   Cardiovascular:      Rate and Rhythm: Normal rate and regular rhythm.      Pulses: Normal pulses.      Heart sounds: Normal heart sounds. No murmur heard.  Pulmonary:      Effort: Pulmonary effort is normal. No respiratory distress.   Genitourinary:     Testes:         Right: Tenderness and " swelling present.   Neurological:      Mental Status: He is alert and oriented to person, place, and time.   Psychiatric:         Mood and Affect: Mood normal.         Behavior: Behavior normal.       Lab Results   Component Value Date    WBC 6.64 03/31/2015    HGB 15.3 03/31/2015    HCT 45.1 03/31/2015     03/31/2015    CHOL 233 (H) 08/21/2016    TRIG 115 08/21/2016    HDL 37 (L) 08/21/2016    ALT 15 08/21/2016    AST 16 08/21/2016     08/21/2016    K 4.6 08/21/2016     08/21/2016    CREATININE 1.2 08/21/2016    BUN 14 08/21/2016    CO2 24 08/21/2016    HGBA1C 5.3 03/31/2015       Assessment:       1. Annual physical exam    2. Right groin pain    3. Left-sided chest pain    4. Prostate cancer screening    5. Need for hepatitis C screening test    6. Chronic thumb pain, left    7. Family history of prostate cancer    8. Overweight (BMI 25.0-29.9)          Plan:   1. Annual physical exam  -     Comprehensive Metabolic Panel; Future; Expected date: 12/23/2022  -     CBC Without Differential; Future; Expected date: 12/23/2022  -     Lipid Panel; Future; Expected date: 12/23/2022  -     Hemoglobin A1C; Future; Expected date: 12/23/2022  -     TSH; Future; Expected date: 12/23/2022    2. Right groin pain  -     US Scrotum And Testicles; Future; Expected date: 12/23/2022  Considering hernia, red flags discussed. Limit straining as much as can be controlled.   3. Left-sided chest pain  -     EKG 12-lead    4. Prostate cancer screening  -     PSA, Screening; Future; Expected date: 12/23/2022    5. Need for hepatitis C screening test  -     Hepatitis C Antibody; Future; Expected date: 12/23/2022    6. Chronic thumb pain, left  -     Ambulatory referral/consult to Orthopedics; Future; Expected date: 12/23/2022    7. Family history of prostate cancer  Overview:  Maternal uncle      8. Overweight (BMI 25.0-29.9)        Health maintenance reviewed with patient.     Prob focused:  Left sided chest pain x 2wks.  Pulling sensation. Can feel with walking, can feel with reaching/bending over.   No n/v, diaphoresis. Tolerating po. Has felt more gassy in last couple weeks.   Not feeling currently in office here while sitting  ROS: chest pain, left shoulder pain, no n or diaphoresis  PE: rrr  Plan: ekg today, labs today    Follow up should symptoms persist or worsen. If symptoms become severe, please report immediately to urgent care or emergency room for further evaluation. Patient voiced understanding.      No follow-ups on file.    Rakesh eDnt MD  Family Medicine / Primary Care  [facility]  12/23/2022

## 2022-12-24 ENCOUNTER — PATIENT MESSAGE (OUTPATIENT)
Dept: ADMINISTRATIVE | Facility: HOSPITAL | Age: 47
End: 2022-12-24
Payer: COMMERCIAL

## 2023-01-03 ENCOUNTER — TELEPHONE (OUTPATIENT)
Dept: INTERNAL MEDICINE | Facility: CLINIC | Age: 48
End: 2023-01-03
Payer: COMMERCIAL

## 2023-01-06 ENCOUNTER — PATIENT MESSAGE (OUTPATIENT)
Dept: INTERNAL MEDICINE | Facility: CLINIC | Age: 48
End: 2023-01-06
Payer: COMMERCIAL

## 2023-01-06 ENCOUNTER — TELEPHONE (OUTPATIENT)
Dept: INTERNAL MEDICINE | Facility: CLINIC | Age: 48
End: 2023-01-06
Payer: COMMERCIAL

## 2023-01-06 DIAGNOSIS — R10.31 RIGHT GROIN PAIN: Primary | ICD-10-CM

## 2023-01-06 NOTE — TELEPHONE ENCOUNTER
Spoke to his wife Rena, told her Dr. Dent is putting in a new order and I will fax the order to the number provided.

## 2023-01-13 DIAGNOSIS — M79.642 LEFT HAND PAIN: Primary | ICD-10-CM

## 2023-01-19 ENCOUNTER — TELEPHONE (OUTPATIENT)
Dept: ORTHOPEDICS | Facility: CLINIC | Age: 48
End: 2023-01-19
Payer: COMMERCIAL

## 2023-01-26 ENCOUNTER — HOSPITAL ENCOUNTER (OUTPATIENT)
Dept: RADIOLOGY | Facility: OTHER | Age: 48
Discharge: HOME | End: 2023-01-26
Attending: ORTHOPAEDIC SURGERY
Payer: COMMERCIAL

## 2023-01-26 ENCOUNTER — OFFICE VISIT (OUTPATIENT)
Dept: ORTHOPEDICS | Facility: CLINIC | Age: 48
End: 2023-01-26
Payer: COMMERCIAL

## 2023-01-26 VITALS — BODY MASS INDEX: 28.06 KG/M2 | HEIGHT: 70 IN | WEIGHT: 196 LBS

## 2023-01-26 DIAGNOSIS — G89.29 CHRONIC THUMB PAIN, LEFT: ICD-10-CM

## 2023-01-26 DIAGNOSIS — M79.645 CHRONIC THUMB PAIN, LEFT: ICD-10-CM

## 2023-01-26 DIAGNOSIS — M79.642 LEFT HAND PAIN: ICD-10-CM

## 2023-01-26 PROCEDURE — 99999 PR PBB SHADOW E&M-EST. PATIENT-LVL III: ICD-10-PCS | Mod: PBBFAC,,, | Performed by: ORTHOPAEDIC SURGERY

## 2023-01-26 PROCEDURE — 73130 X-RAY EXAM OF HAND: CPT | Mod: TC,FY,LT

## 2023-01-26 PROCEDURE — 73130 X-RAY EXAM OF HAND: CPT | Mod: 26,LT,, | Performed by: RADIOLOGY

## 2023-01-26 PROCEDURE — 20600 DRAIN/INJ JOINT/BURSA W/O US: CPT | Mod: LT,S$GLB,, | Performed by: PHYSICIAN ASSISTANT

## 2023-01-26 PROCEDURE — 99214 PR OFFICE/OUTPT VISIT, EST, LEVL IV, 30-39 MIN: ICD-10-PCS | Mod: 25,S$GLB,, | Performed by: PHYSICIAN ASSISTANT

## 2023-01-26 PROCEDURE — 73130 XR HAND COMPLETE 3 VIEW LEFT: ICD-10-PCS | Mod: 26,LT,, | Performed by: RADIOLOGY

## 2023-01-26 PROCEDURE — 20600 SMALL JOINT ASPIRATION/INJECTION: R THUMB MCP: ICD-10-PCS | Mod: LT,S$GLB,, | Performed by: PHYSICIAN ASSISTANT

## 2023-01-26 PROCEDURE — 99999 PR PBB SHADOW E&M-EST. PATIENT-LVL III: CPT | Mod: PBBFAC,,, | Performed by: ORTHOPAEDIC SURGERY

## 2023-01-26 PROCEDURE — 99214 OFFICE O/P EST MOD 30 MIN: CPT | Mod: 25,S$GLB,, | Performed by: PHYSICIAN ASSISTANT

## 2023-01-26 RX ADMIN — DEXAMETHASONE SODIUM PHOSPHATE 4 MG: 4 INJECTION, SOLUTION INTRA-ARTICULAR; INTRALESIONAL; INTRAMUSCULAR; INTRAVENOUS; SOFT TISSUE at 08:01

## 2023-01-26 NOTE — PROGRESS NOTES
"Subjective:      Patient ID: Josh Jimenez Jr. is a 47 y.o. male.    Chief Complaint: Pain of the Left Hand      HPI  Josh Jimenez Jr. is a  47 y.o. male presenting today for evaluation of the left thumb. He reports an injury to the left thumb 2-3 years ago while working as an . He reports he splinted the thumb for a period of time. He reports decreased full extension/ hyper extension of the thumb since injury. Over time especially over the past few months he has begun to note increased pain in the thumb. Pain is increased with use, if he bumps the thumb. He has not yet tried anything.     Review of patient's allergies indicates:  No Known Allergies      No current outpatient medications on file.     No current facility-administered medications for this visit.       History reviewed. No pertinent past medical history.    History reviewed. No pertinent surgical history.    Review of Systems:  Constitutional: Negative for chills and fever.   Respiratory: Negative for cough and shortness of breath.    Gastrointestinal: Negative for nausea and vomiting.   Skin: Negative for rash.   Neurological: Negative for dizziness and headaches.   Psychiatric/Behavioral: Negative for depression.   MSK as in HPI       OBJECTIVE:     PHYSICAL EXAM:  Ht 5' 10" (1.778 m)   Wt 88.9 kg (196 lb)   BMI 28.12 kg/m²     GEN:  NAD, well-developed, well-groomed.  NEURO: Awake, alert, and oriented. Normal attention and concentration.    PSYCH: Normal mood and affect. Behavior is normal.  HEENT: No cervical lymphadenopathy noted.  CARDIOVASCULAR: Radial pulses 2+ bilaterally. No LE edema noted.  PULMONARY: Breath sounds normal. No respiratory distress.  SKIN: Intact, no rashes.      MSK:   LUE:  Good active ROM of the wrist and fingers. Ttp thumb MCP. Collateral ligaments appear stable. He has decreased full thumb extension/ hyper extension on the left compared to right. No evidence of tendon injury. AIN/PIN/Radial/Median/Ulnar Nerves " assessed in isolation without deficit. Radial & Ulnar arteries palpated 2+. Capillary Refill <3s.      RADIOGRAPHS:  Xray left hand 1/26/23   FINDINGS:  The bones are intact.  There is no evidence for acute fracture or bone destruction.  There is no evidence for dislocation.  No bony erosions are identified.  Soft tissues are unremarkable.     Impression:     No evidence for acute fracture, bone destruction, or dislocation.  Comments: I have personally reviewed the imaging and I agree with the above radiologist's report.    ASSESSMENT/PLAN:       ICD-10-CM ICD-9-CM   1. Chronic thumb pain, left  M79.645 729.5    G89.29 338.29       Orders Placed This Encounter    Ambulatory referral/consult to Physical/Occupational Therapy        Plan:   Plan for left thumb MCP injection today   Therapy ordered  RTC 6 wks if symptoms persist may obtain MRI       PROCEDURE:  I have explained the risks, benefits, and alternatives of the procedure in detail.  The patient voices understanding and all questions have been answered.  The patient agrees to proceed as planned. So after a sterile prep of the skin in the normal fashion the left thumb MCP is injected from the volar approach using a 25 gauge needle with a combination of 0.5cc 1% plain lidocaine and 4 mg of dexamethasone.  The patient is cautioned and immediate relief of pain is secondary to the local anesthetic and will be temporary.  After the anesthetic wears off there may be a increase in pain that may last for a few hours or a few days and they should use ice to help alleviate this flair up of pain. Patient tolerated the procedure well.         The patient indicates understanding of these issues and agrees to the plan.    Adrianne Rome PA-C  Hand Clinic   [facility]  [place], [place]

## 2023-01-30 RX ORDER — DEXAMETHASONE SODIUM PHOSPHATE 4 MG/ML
4 INJECTION, SOLUTION INTRA-ARTICULAR; INTRALESIONAL; INTRAMUSCULAR; INTRAVENOUS; SOFT TISSUE
Status: DISCONTINUED | OUTPATIENT
Start: 2023-01-26 | End: 2023-01-30 | Stop reason: HOSPADM

## 2023-01-30 NOTE — PROGRESS NOTES
I have personally taken the history and examined this patient. I agree with the resident's note as stated above.    Plan:   Plan for left thumb MCP injection today   Therapy ordered  RTC 6 wks if symptoms persist may obtain MRI

## 2023-01-30 NOTE — PROCEDURES
Small Joint Aspiration/Injection: R thumb MCP    Date/Time: 1/26/2023 8:45 AM  Performed by: Carly Baires MD  Authorized by: Carly Baires MD     Consent Done?:  Yes (Verbal)  Indications:  Pain  Timeout: prior to procedure the correct patient, procedure, and site was verified    Prep: patient was prepped and draped in usual sterile fashion      Local anesthesia used?: Yes    Anesthesia:  Local infiltration  Local anesthetic:  Lidocaine 1% without epinephrine  Location:  Thumb  Site:  R thumb MCP  Medications:  4 mg dexAMETHasone 4 mg/mL

## 2023-02-02 ENCOUNTER — PATIENT MESSAGE (OUTPATIENT)
Dept: INTERNAL MEDICINE | Facility: CLINIC | Age: 48
End: 2023-02-02
Payer: COMMERCIAL

## 2023-02-02 DIAGNOSIS — R97.20 ELEVATED PSA, LESS THAN 10 NG/ML: Primary | ICD-10-CM

## 2023-02-08 ENCOUNTER — LAB VISIT (OUTPATIENT)
Dept: LAB | Facility: HOSPITAL | Age: 48
End: 2023-02-08
Attending: STUDENT IN AN ORGANIZED HEALTH CARE EDUCATION/TRAINING PROGRAM
Payer: COMMERCIAL

## 2023-02-08 ENCOUNTER — TELEPHONE (OUTPATIENT)
Dept: INTERNAL MEDICINE | Facility: CLINIC | Age: 48
End: 2023-02-08
Payer: COMMERCIAL

## 2023-02-08 DIAGNOSIS — R97.20 ELEVATED PSA, LESS THAN 10 NG/ML: ICD-10-CM

## 2023-02-08 LAB — COMPLEXED PSA SERPL-MCNC: 3.7 NG/ML (ref 0–4)

## 2023-02-08 PROCEDURE — 36415 COLL VENOUS BLD VENIPUNCTURE: CPT | Performed by: STUDENT IN AN ORGANIZED HEALTH CARE EDUCATION/TRAINING PROGRAM

## 2023-02-08 PROCEDURE — 84153 ASSAY OF PSA TOTAL: CPT | Performed by: STUDENT IN AN ORGANIZED HEALTH CARE EDUCATION/TRAINING PROGRAM

## 2023-03-07 ENCOUNTER — TELEPHONE (OUTPATIENT)
Dept: ORTHOPEDICS | Facility: CLINIC | Age: 48
End: 2023-03-07
Payer: COMMERCIAL

## 2023-08-09 ENCOUNTER — OFFICE VISIT (OUTPATIENT)
Dept: INTERNAL MEDICINE | Facility: CLINIC | Age: 48
End: 2023-08-09
Payer: COMMERCIAL

## 2023-08-09 ENCOUNTER — PATIENT OUTREACH (OUTPATIENT)
Dept: ADMINISTRATIVE | Facility: HOSPITAL | Age: 48
End: 2023-08-09
Payer: COMMERCIAL

## 2023-08-09 VITALS
SYSTOLIC BLOOD PRESSURE: 132 MMHG | OXYGEN SATURATION: 98 % | WEIGHT: 193.31 LBS | HEIGHT: 70 IN | HEART RATE: 80 BPM | BODY MASS INDEX: 27.67 KG/M2 | DIASTOLIC BLOOD PRESSURE: 82 MMHG

## 2023-08-09 DIAGNOSIS — Z91.89 FRAMINGHAM CARDIAC RISK <10% IN NEXT 10 YEARS: ICD-10-CM

## 2023-08-09 DIAGNOSIS — R07.9 LEFT-SIDED CHEST PAIN: Primary | ICD-10-CM

## 2023-08-09 DIAGNOSIS — E78.2 MIXED HYPERLIPIDEMIA: ICD-10-CM

## 2023-08-09 DIAGNOSIS — E66.3 OVERWEIGHT (BMI 25.0-29.9): ICD-10-CM

## 2023-08-09 PROBLEM — R97.20 ELEVATED PSA, LESS THAN 10 NG/ML: Status: RESOLVED | Noted: 2022-12-23 | Resolved: 2023-08-09

## 2023-08-09 PROCEDURE — 93005 EKG 12-LEAD: ICD-10-PCS | Mod: S$GLB,,, | Performed by: FAMILY MEDICINE

## 2023-08-09 PROCEDURE — 93010 EKG 12-LEAD: ICD-10-PCS | Mod: S$GLB,,, | Performed by: INTERNAL MEDICINE

## 2023-08-09 PROCEDURE — 99214 OFFICE O/P EST MOD 30 MIN: CPT | Mod: S$GLB,,, | Performed by: FAMILY MEDICINE

## 2023-08-09 PROCEDURE — 99214 PR OFFICE/OUTPT VISIT, EST, LEVL IV, 30-39 MIN: ICD-10-PCS | Mod: S$GLB,,, | Performed by: FAMILY MEDICINE

## 2023-08-09 PROCEDURE — 93005 ELECTROCARDIOGRAM TRACING: CPT | Mod: S$GLB,,, | Performed by: FAMILY MEDICINE

## 2023-08-09 PROCEDURE — 99999 PR PBB SHADOW E&M-EST. PATIENT-LVL IV: CPT | Mod: PBBFAC,,, | Performed by: FAMILY MEDICINE

## 2023-08-09 PROCEDURE — 93010 ELECTROCARDIOGRAM REPORT: CPT | Mod: S$GLB,,, | Performed by: INTERNAL MEDICINE

## 2023-08-09 PROCEDURE — 99999 PR PBB SHADOW E&M-EST. PATIENT-LVL IV: ICD-10-PCS | Mod: PBBFAC,,, | Performed by: FAMILY MEDICINE

## 2023-08-09 NOTE — PROGRESS NOTES
Subjective:       Patient ID: Josh Jimenez Jr. is a 47 y.o. male.    Chief Complaint: Annual Exam    HPI    Josh Jimenez Jr. is a 47 y.o. male for checkup.     Atypical chest pain suggestive of msk, persistent since lv. Will feel with positional change.    #Cards: HLD    #Ortho: Lt thumb pain  - est w/ Dr. Baires, lv 1/2023    #BMI 27  - rides bike, does pushups    The 10-year ASCVD risk score (Parish LING, et al., 2019) is: 5%    Values used to calculate the score:      Age: 47 years      Sex: Male      Is Non- : Yes      Diabetic: No      Tobacco smoker: No      Systolic Blood Pressure: 132 mmHg      Is BP treated: No      HDL Cholesterol: 38 mg/dL      Total Cholesterol: 240 mg/dL    Review of Systems   Constitutional:  Negative for activity change and unexpected weight change.   HENT:  Negative for hearing loss, rhinorrhea and trouble swallowing.    Eyes:  Negative for discharge and visual disturbance.   Respiratory:  Positive for chest tightness. Negative for wheezing.    Cardiovascular:  Positive for chest pain. Negative for palpitations.   Gastrointestinal:  Negative for blood in stool, constipation, diarrhea and vomiting.   Endocrine: Negative for polydipsia and polyuria.   Genitourinary:  Negative for difficulty urinating, hematuria and urgency.   Musculoskeletal:  Negative for arthralgias, joint swelling and neck pain.   Neurological:  Negative for headaches.   Psychiatric/Behavioral:  Negative for confusion and dysphoric mood.          Past Medical History:   Diagnosis Date    Elevated PSA, less than 10 ng/ml 12/23/2022        Prior to Admission medications    Not on File        Past medical history, surgical history, and family medical history reviewed and updated as appropriate.    Medications and allergies reviewed.     Objective:          Vitals:    08/09/23 1318   BP: 132/82   BP Location: Right arm   Patient Position: Sitting   Pulse: 80   SpO2: 98%   Weight: 87.7 kg (193 lb  "5.5 oz)   Height: 5' 10" (1.778 m)     Body mass index is 27.74 kg/m².  Physical Exam  Vitals and nursing note reviewed.   Constitutional:       General: He is not in acute distress.     Appearance: He is not ill-appearing.   Cardiovascular:      Rate and Rhythm: Normal rate and regular rhythm.      Pulses: Normal pulses.      Heart sounds: Normal heart sounds. No murmur heard.  Pulmonary:      Effort: Pulmonary effort is normal. No respiratory distress.   Neurological:      Mental Status: He is alert and oriented to person, place, and time.   Psychiatric:         Mood and Affect: Mood normal.         Behavior: Behavior normal.         Lab Results   Component Value Date    WBC 6.17 12/23/2022    HGB 15.5 12/23/2022    HCT 47.6 12/23/2022     12/23/2022    CHOL 240 (H) 12/23/2022    TRIG 115 12/23/2022    HDL 38 (L) 12/23/2022    ALT 21 12/23/2022    AST 15 12/23/2022     12/23/2022    K 4.3 12/23/2022     12/23/2022    CREATININE 1.1 12/23/2022    BUN 16 12/23/2022    CO2 28 12/23/2022    TSH 0.755 12/23/2022    PSA 3.7 02/08/2023    HGBA1C 5.3 12/23/2022       Assessment:       1. Left-sided chest pain    2. Mixed hyperlipidemia    3. Kemp cardiac risk <10% in next 10 years    4. Overweight (BMI 25.0-29.9)          Plan:   1. Left-sided chest pain  -     EKG 12-lead  -     Ambulatory referral/consult to Cardiology; Future; Expected date: 08/09/2023    2. Mixed hyperlipidemia  -     Ambulatory referral/consult to Cardiology; Future; Expected date: 08/09/2023    3. Kemp cardiac risk <10% in next 10 years    4. Overweight (BMI 25.0-29.9)  Overview:  Counseled regarding benefits of healthy diet (goal of 5 or more servings of fruits/veggies daily, water as main drink, increased consumption of healthy fats such as nuts, beans, avocados, olive oil instead of unhealthy fat options) and physical activity (150 minutes of moderate-intensity aerobic activity per week) to improve overall " health.          Health maintenance reviewed with patient.     No follow-ups on file.    Rakesh Dent MD  Family Medicine / Primary Care  [facility]  8/9/2023

## 2023-08-09 NOTE — Clinical Note
He had stool study kit done thru insurance this summer. Not sure how you would get this info into the chart. Wife says it was thru [facility]

## 2023-08-21 ENCOUNTER — PATIENT MESSAGE (OUTPATIENT)
Dept: INTERNAL MEDICINE | Facility: CLINIC | Age: 48
End: 2023-08-21
Payer: COMMERCIAL

## 2023-08-21 ENCOUNTER — LAB VISIT (OUTPATIENT)
Dept: LAB | Facility: HOSPITAL | Age: 48
End: 2023-08-21
Payer: COMMERCIAL

## 2023-08-21 ENCOUNTER — OFFICE VISIT (OUTPATIENT)
Dept: CARDIOLOGY | Facility: CLINIC | Age: 48
End: 2023-08-21
Payer: COMMERCIAL

## 2023-08-21 VITALS
DIASTOLIC BLOOD PRESSURE: 70 MMHG | SYSTOLIC BLOOD PRESSURE: 130 MMHG | OXYGEN SATURATION: 98 % | HEIGHT: 70 IN | HEART RATE: 75 BPM | WEIGHT: 198.63 LBS | BODY MASS INDEX: 28.44 KG/M2

## 2023-08-21 DIAGNOSIS — E78.2 MIXED HYPERLIPIDEMIA: ICD-10-CM

## 2023-08-21 DIAGNOSIS — R07.9 LEFT-SIDED CHEST PAIN: ICD-10-CM

## 2023-08-21 DIAGNOSIS — R06.09 DOE (DYSPNEA ON EXERTION): Primary | ICD-10-CM

## 2023-08-21 LAB
CHOLEST SERPL-MCNC: 243 MG/DL (ref 120–199)
CHOLEST/HDLC SERPL: 7.1 {RATIO} (ref 2–5)
HDLC SERPL-MCNC: 34 MG/DL (ref 40–75)
HDLC SERPL: 14 % (ref 20–50)
LDLC SERPL CALC-MCNC: 180.6 MG/DL (ref 63–159)
NONHDLC SERPL-MCNC: 209 MG/DL
TRIGL SERPL-MCNC: 142 MG/DL (ref 30–150)

## 2023-08-21 PROCEDURE — 99999 PR PBB SHADOW E&M-EST. PATIENT-LVL IV: CPT | Mod: PBBFAC,,, | Performed by: HOSPITALIST

## 2023-08-21 PROCEDURE — 36415 COLL VENOUS BLD VENIPUNCTURE: CPT | Performed by: HOSPITALIST

## 2023-08-21 PROCEDURE — 99999 PR PBB SHADOW E&M-EST. PATIENT-LVL IV: ICD-10-PCS | Mod: PBBFAC,,, | Performed by: HOSPITALIST

## 2023-08-21 PROCEDURE — 80061 LIPID PANEL: CPT | Performed by: HOSPITALIST

## 2023-08-21 PROCEDURE — 99205 PR OFFICE/OUTPT VISIT, NEW, LEVL V, 60-74 MIN: ICD-10-PCS | Mod: S$GLB,,, | Performed by: HOSPITALIST

## 2023-08-21 PROCEDURE — 99205 OFFICE O/P NEW HI 60 MIN: CPT | Mod: S$GLB,,, | Performed by: HOSPITALIST

## 2023-08-21 RX ORDER — ATORVASTATIN CALCIUM 40 MG/1
40 TABLET, FILM COATED ORAL DAILY
Qty: 90 TABLET | Refills: 3 | Status: SHIPPED | OUTPATIENT
Start: 2023-08-21 | End: 2024-08-20

## 2023-08-21 NOTE — PROGRESS NOTES
Cardiology Clinic Note  Reason for Visit: Chest Pain      Referring physician: Dr Filipe MD  Reason for referral:  Chest pain    HPI:   47-year-old male with past medical history of hyperlipidemia complains of having left shoulder pain that radiates to his chest for more than a month which is intermittent for which he was sent for evaluation.  Patient complains of having aching sensation which is more like pins and needles sensation in his shoulder that radiates to his chest and is more pronounced whenever he moves his his arms.  Patient also complains of having shortness of breath on exertion which is new for him when compared to 3 months back.  He states that he recently started exercising once a week where he does bike exercise for more than 1 hour and denies having any symptoms but when he starts climbing stairs he notices shortness of breath which he states his new.  Patient has elevated LDL and total cholesterol in December 2022 and he was asked to take statins but patient declined it and he stated that he wanted to work on his diet and exercise but states that he has not changed his diet so far  ROS:      Review of Systems   Constitutional: Negative.    HENT: Negative.     Eyes: Negative.    Respiratory: Negative.     Cardiovascular:  Positive for chest pain.   Gastrointestinal: Negative.    Genitourinary: Negative.    Musculoskeletal: Negative.    Skin: Negative.        PMH:     Past Medical History:   Diagnosis Date    Elevated PSA, less than 10 ng/ml 12/23/2022       PSH:   History reviewed. No pertinent surgical history.  Allergies:   Review of patient's allergies indicates:  No Known Allergies  Medications:     No current outpatient medications on file prior to visit.     No current facility-administered medications on file prior to visit.     Social History:     Social History     Tobacco Use    Smoking status: Never    Smokeless tobacco: Not on file   Substance Use Topics    Alcohol use: Not on file  "    Family History:   History reviewed. No pertinent family history.  Physical Exam:   /70   Pulse 75   Ht 5' 10" (1.778 m)   Wt 90.1 kg (198 lb 10.2 oz)   SpO2 98%   BMI 28.50 kg/m²      Physical Exam  Constitutional:       Appearance: Normal appearance.   HENT:      Head: Normocephalic and atraumatic.   Cardiovascular:      Rate and Rhythm: Normal rate and regular rhythm.      Pulses: Normal pulses.      Heart sounds: Normal heart sounds.   Pulmonary:      Effort: Pulmonary effort is normal.      Breath sounds: Normal breath sounds.   Abdominal:      General: Abdomen is flat. Bowel sounds are normal.      Palpations: Abdomen is soft.   Musculoskeletal:         General: Normal range of motion.      Cervical back: Normal range of motion.   Skin:     General: Skin is warm.      Capillary Refill: Capillary refill takes less than 2 seconds.   Neurological:      General: No focal deficit present.      Mental Status: He is alert.         Notable Labs:     Lab Results   Component Value Date     12/23/2022    K 4.3 12/23/2022     12/23/2022    CO2 28 12/23/2022    BUN 16 12/23/2022    CREATININE 1.1 12/23/2022    ANIONGAP 7 (L) 12/23/2022     Lab Results   Component Value Date    HGBA1C 5.3 12/23/2022     Lab Results   Component Value Date    BNP <10 03/31/2015    Lab Results   Component Value Date    WBC 6.17 12/23/2022    HGB 15.5 12/23/2022    HCT 47.6 12/23/2022     12/23/2022    GRAN 3.2 03/31/2015    GRAN 47.4 03/31/2015     Lab Results   Component Value Date    CHOL 243 (H) 08/21/2023    HDL 34 (L) 08/21/2023    LDLCALC 180.6 (H) 08/21/2023    TRIG 142 08/21/2023          Imaging:   No results found for: "EF"    EKG: normal sinus rhythm, no blocks or conduction defects, no ischemic changes  Assessment:     1. MODI (dyspnea on exertion)    2. Left-sided chest pain    3. Mixed hyperlipidemia        Plan:     Josh was seen today for chest pain.    Diagnoses and all orders for this " visit:    MODI (dyspnea on exertion)  Likely his dyspnea on exertion is secondary to deconditioning but given his elevated cholesterol and his age will check exercise echo to rule out any coronary artery disease  -     Stress Echo Which stress agent will be used? Treadmill Exercise; Color Flow Doppler? No; Future    Left-sided chest pain  Non cardiac chest pain as his pain is reproducible with movements involving his left shoulder    Mixed hyperlipidemia  Elevated from December 2022 .  He declined statin during that period.  Recheck his lipid panel today and still has elevated cholesterol with his LDL at 180.  Will start him on Lipitor 40 mg daily    The patient Josh Barbara Oscar. case was discussed with attending provider Dr. Solomon .  Return to clinic in 12 months    Faid Romero MD  Cardiology Fellow

## 2023-08-30 ENCOUNTER — HOSPITAL ENCOUNTER (OUTPATIENT)
Dept: CARDIOLOGY | Facility: HOSPITAL | Age: 48
Discharge: HOME | End: 2023-08-30
Attending: HOSPITALIST
Payer: COMMERCIAL

## 2023-08-30 DIAGNOSIS — R06.09 DOE (DYSPNEA ON EXERTION): ICD-10-CM

## 2023-08-30 LAB
ASCENDING AORTA: 3.06 CM
CV ECHO LV RWT: 0.32 CM
CV STRESS BASE HR: 76 BPM
DIASTOLIC BLOOD PRESSURE: 83 MMHG
DOP CALC LVOT AREA: 3.8 CM2
DOP CALC LVOT DIAMETER: 2.2 CM
DOP CALC LVOT PEAK VEL: 0.92 M/S
DOP CALC LVOT STROKE VOLUME: 57.52 CM3
DOP CALCLVOT PEAK VEL VTI: 15.14 CM
E WAVE DECELERATION TIME: 201.67 MSEC
E/A RATIO: 0.98
E/E' RATIO: 7 M/S
ECHO LV POSTERIOR WALL: 0.75 CM (ref 0.6–1.1)
FRACTIONAL SHORTENING: 34 % (ref 28–44)
INTERVENTRICULAR SEPTUM: 0.87 CM (ref 0.6–1.1)
LA MAJOR: 4.16 CM
LA MINOR: 3.73 CM
LA WIDTH: 3.05 CM
LEFT ATRIUM SIZE: 2.23 CM
LEFT ATRIUM VOLUME MOD: 26.31 CM3
LEFT ATRIUM VOLUME: 22.74 CM3
LEFT INTERNAL DIMENSION IN SYSTOLE: 3.07 CM (ref 2.1–4)
LEFT VENTRICLE DIASTOLIC VOLUME: 99.83 ML
LEFT VENTRICLE SYSTOLIC VOLUME: 37.09 ML
LEFT VENTRICULAR INTERNAL DIMENSION IN DIASTOLE: 4.65 CM (ref 3.5–6)
LEFT VENTRICULAR MASS: 122.03 G
LV LATERAL E/E' RATIO: 5.09 M/S
LV SEPTAL E/E' RATIO: 11.2 M/S
MV A" WAVE DURATION": 9.42 MSEC
MV PEAK A VEL: 0.57 M/S
MV PEAK E VEL: 0.56 M/S
MV STENOSIS PRESSURE HALF TIME: 58.48 MS
MV VALVE AREA P 1/2 METHOD: 3.76 CM2
OHS CV CPX 1 MINUTE RECOVERY HEART RATE: 123 BPM
OHS CV CPX 85 PERCENT MAX PREDICTED HEART RATE MALE: 149
OHS CV CPX ESTIMATED METS: 9
OHS CV CPX MAX PREDICTED HEART RATE: 175
OHS CV CPX PATIENT IS FEMALE: 0
OHS CV CPX PATIENT IS MALE: 1
OHS CV CPX PEAK DIASTOLIC BLOOD PRESSURE: 76 MMHG
OHS CV CPX PEAK HEAR RATE: 162 BPM
OHS CV CPX PEAK RATE PRESSURE PRODUCT: NORMAL
OHS CV CPX PEAK SYSTOLIC BLOOD PRESSURE: 227 MMHG
OHS CV CPX PERCENT MAX PREDICTED HEART RATE ACHIEVED: 93
OHS CV CPX RATE PRESSURE PRODUCT PRESENTING: 9576
PULM VEIN S/D RATIO: 1.29
PV PEAK D VEL: 0.41 M/S
PV PEAK S VEL: 0.53 M/S
RA MAJOR: 4.33 CM
RA PRESSURE ESTIMATED: 3 MMHG
RA WIDTH: 3.05 CM
RIGHT VENTRICULAR END-DIASTOLIC DIMENSION: 2.79 CM
SINUS: 3.46 CM
STJ: 2.95 CM
STRESS ECHO POST EXERCISE DUR MIN: 6 MINUTES
STRESS ECHO POST EXERCISE DUR SEC: 54 SECONDS
SYSTOLIC BLOOD PRESSURE: 126 MMHG
TDI LATERAL: 0.11 M/S
TDI SEPTAL: 0.05 M/S
TDI: 0.08 M/S
TRICUSPID ANNULAR PLANE SYSTOLIC EXCURSION: 1.9 CM

## 2023-08-30 PROCEDURE — 93351 STRESS TTE COMPLETE: CPT

## 2023-08-30 PROCEDURE — 93351 STRESS ECHO (CUPID ONLY): ICD-10-PCS | Mod: 26,,, | Performed by: INTERNAL MEDICINE

## 2023-08-30 PROCEDURE — 93351 STRESS TTE COMPLETE: CPT | Mod: 26,,, | Performed by: INTERNAL MEDICINE

## 2023-11-03 ENCOUNTER — TELEPHONE (OUTPATIENT)
Dept: INTERNAL MEDICINE | Facility: CLINIC | Age: 48
End: 2023-11-03
Payer: COMMERCIAL

## 2023-11-03 ENCOUNTER — TELEPHONE (OUTPATIENT)
Dept: ENDOSCOPY | Facility: HOSPITAL | Age: 48
End: 2023-11-03
Payer: COMMERCIAL

## 2023-11-03 DIAGNOSIS — Z12.12 ENCOUNTER FOR COLORECTAL CANCER SCREENING: Primary | ICD-10-CM

## 2023-11-03 DIAGNOSIS — Z12.11 ENCOUNTER FOR COLORECTAL CANCER SCREENING: Primary | ICD-10-CM

## 2023-11-03 NOTE — TELEPHONE ENCOUNTER
----- Message from Emmanuelle Hayden sent at 11/3/2023  1:30 PM CDT -----  Type:  Patient Requesting Referral - Colonoscopy    Who Called: Pt  Does the patient already have the specialty appointment scheduled?:  If yes, what is the date of that appointment?:  Referral to What Specialty: Colonoscopy  Reason for Referral: GI  Does the patient want the referral with a specific physician?:  [facility]  Patient Requesting a Response?: Yes  Would the patient rather a call back or a response via [facility]? Call  Best Call Back Number: [phone]  Additional Information:

## 2023-11-03 NOTE — TELEPHONE ENCOUNTER
----- Message from Emmanuelle Hayden sent at 11/3/2023  1:30 PM CDT -----  Type:  Patient Requesting Referral - Colonoscopy    Who Called: Pt  Does the patient already have the specialty appointment scheduled?:  If yes, what is the date of that appointment?:  Referral to What Specialty: Colonoscopy  Reason for Referral: GI  Does the patient want the referral with a specific physician?:  [facility] the [facility]? [facility]  Patient Requesting a Response?: Yes  Would the patient rather a call back or a response via [facility]? Call  Best Call Back Number: [phone]  Additional Information:

## 2023-11-06 ENCOUNTER — TELEPHONE (OUTPATIENT)
Dept: ENDOSCOPY | Facility: HOSPITAL | Age: 48
End: 2023-11-06
Payer: COMMERCIAL

## 2023-11-07 ENCOUNTER — TELEPHONE (OUTPATIENT)
Dept: ENDOSCOPY | Facility: HOSPITAL | Age: 48
End: 2023-11-07
Payer: COMMERCIAL

## 2023-11-07 DIAGNOSIS — Z12.12 ENCOUNTER FOR COLORECTAL CANCER SCREENING: Primary | ICD-10-CM

## 2023-11-07 DIAGNOSIS — Z12.11 SCREENING FOR COLON CANCER: Primary | ICD-10-CM

## 2023-11-07 DIAGNOSIS — Z12.11 ENCOUNTER FOR COLORECTAL CANCER SCREENING: Primary | ICD-10-CM

## 2023-11-07 DIAGNOSIS — Z12.11 SPECIAL SCREENING FOR MALIGNANT NEOPLASMS, COLON: Primary | ICD-10-CM

## 2023-11-07 RX ORDER — POLYETHYLENE GLYCOL 3350, SODIUM SULFATE ANHYDROUS, SODIUM BICARBONATE, SODIUM CHLORIDE, POTASSIUM CHLORIDE 236; 22.74; 6.74; 5.86; 2.97 G/4L; G/4L; G/4L; G/4L; G/4L
4 POWDER, FOR SOLUTION ORAL ONCE
Qty: 4000 ML | Refills: 0 | Status: SHIPPED | OUTPATIENT
Start: 2023-11-07 | End: 2023-11-07

## 2023-11-07 NOTE — TELEPHONE ENCOUNTER
Spoke to pt to schedule procedure(s) Colonoscopy       Physician to perform procedure(s) Dr. AURORA Irvin  Date of Procedure (s) 02/14/24  Arrival Time 12:30 PM  Time of Procedure(s) 1:30 PM   Location of Procedure(s) [facility] [address] [facility]  Type of Rx Prep sent to patient: PEG  Instructions provided to patient via [facility]    Patient was informed on the following information and verbalized understanding. Screening questionnaire reviewed with patient and complete. If procedure requires anesthesia, a responsible adult needs to be present to accompany the patient home, patient cannot drive after receiving anesthesia. Appointment details are tentative, especially check-in time. Patient will receive a prep-op call 4 days prior to confirm check-in time for procedure. If applicable the patient should contact their pharmacy to verify Rx for procedure prep is ready for pick-up. Patient was advised to call the scheduling department at [phone] if pharmacy states no Rx is available. Patient was advised to call the endoscopy scheduling department if any questions or concerns arise.      [facility] Scheduling [facility]

## 2023-11-13 ENCOUNTER — CLINICAL SUPPORT (OUTPATIENT)
Dept: ENDOSCOPY | Facility: HOSPITAL | Age: 48
End: 2023-11-13
Attending: FAMILY MEDICINE
Payer: COMMERCIAL

## 2023-11-13 ENCOUNTER — TELEPHONE (OUTPATIENT)
Dept: ENDOSCOPY | Facility: HOSPITAL | Age: 48
End: 2023-11-13

## 2023-11-13 DIAGNOSIS — Z12.11 ENCOUNTER FOR COLORECTAL CANCER SCREENING: ICD-10-CM

## 2023-11-13 DIAGNOSIS — Z12.12 ENCOUNTER FOR COLORECTAL CANCER SCREENING: ICD-10-CM

## 2023-11-13 NOTE — TELEPHONE ENCOUNTER
Spoke to pt's wife, Rena, to reschedule procedure(s) Colonoscopy       Physician to perform procedure(s) Dr. DINORA García  Date of Procedure (s) 2/14/24  Arrival Time 10:00 AM  Time of Procedure(s) 10:00 AM   Location of Procedure(s) [facility] 2nd Floor  Type of Rx Prep sent to patient: PEG  Instructions provided to patient via [facility]    Patient was informed on the following information and verbalized understanding. Screening questionnaire reviewed with patient and complete. If procedure requires anesthesia, a responsible adult needs to be present to accompany the patient home, patient cannot drive after receiving anesthesia. Appointment details are tentative, especially check-in time. Patient will receive a prep-op call 4 days prior to confirm check-in time for procedure. If applicable the patient should contact their pharmacy to verify Rx for procedure prep is ready for pick-up. Patient was advised to call the scheduling department at [phone] if pharmacy states no Rx is available. Patient was advised to call the endoscopy scheduling department if any questions or concerns arise.      [facility] Scheduling [facility]

## 2024-01-08 ENCOUNTER — TELEPHONE (OUTPATIENT)
Dept: ENDOSCOPY | Facility: HOSPITAL | Age: 49
End: 2024-01-08
Payer: COMMERCIAL

## 2024-01-08 NOTE — TELEPHONE ENCOUNTER
Spoke to pt to schedule procedure(s) Colonoscopy       Physician to perform procedure(s) Dr. DINORA García  Date of Procedure (s) 3/13/24  Arrival Time 7:00 AM  Time of Procedure(s) 8:00 AM   Location of Procedure(s) [facility] [address] Floor  Type of Rx Prep sent to patient: PEG  Instructions provided to patient [facility]    Patient was informed on the following information and verbalized understanding. Screening questionnaire reviewed with patient and complete. If procedure requires anesthesia, a responsible adult needs to be present to accompany the patient home, patient cannot drive after receiving anesthesia. Appointment details are tentative, especially check-in time. Patient will receive a prep-op call 7 days prior to confirm check-in time for procedure. If applicable the patient should contact their pharmacy to verify Rx for procedure prep is ready for pick-up. Patient was advised to call the scheduling department at [phone] if pharmacy states no Rx is available. Patient was advised to call the endoscopy scheduling department if any questions or concerns arise.      [facility] Scheduling Department

## 2024-03-04 ENCOUNTER — ANESTHESIA EVENT (OUTPATIENT)
Dept: ENDOSCOPY | Facility: HOSPITAL | Age: 49
End: 2024-03-04
Payer: COMMERCIAL

## 2024-03-04 NOTE — ANESTHESIA PREPROCEDURE EVALUATION
03/04/2024  Josh Jimenez Jr. is a 48 y.o., male.  [facility]  Anesthesia Pre-Operative Evaluation       Patient Name: Josh Jimenez Jr.  YOB: 1975  MRN: [id number]  [facility]: [id number]      Code Status: No Order   Date of Procedure: 3/13/2024  Anesthesia: Choice Procedure: Procedure(s) (LRB):  COLONOSCOPY (N/A)  Pre-Operative Diagnosis: Encounter for colorectal cancer screening [Z12.11, Z12.12]  Proceduralist: Surgeon(s) and Role:     * Sonia García MD - Primary Nurse: (Unknown)      SUBJECTIVE:   Josh Jimenez Jr. is a 48 y.o. male who  has a past medical history of Elevated PSA, less than 10 ng/ml (12/23/2022)..     he has a current medication list which includes the following long-term medication(s): atorvastatin.     ALLERGIES:   Review of patient's allergies indicates:  No Known Allergies  LDA:          Lines/Drains/Airways       None                  Anesthesia Evaluation      Airway   Dental    (+) Intact    Pulmonary    Cardiovascular   Valvular problems/murmurs: MVP.    Neuro/Psych      GI/Hepatic/Renal      Endo/Other    Abdominal                     MEDICATIONS:     Antibiotics (From admission, onward)      None          VTE Risk Mitigation (From admission, onward)      None              No current facility-administered medications for this encounter.     Current Outpatient Medications   Medication Sig Dispense Refill    atorvastatin (LIPITOR) 40 MG tablet Take 1 tablet (40 mg total) by mouth once daily. 90 tablet 3          History:   There are no hospital problems to display for this patient.    Surgical History:    has no past surgical history on file.   Social History:    has no history on file for sexual activity.  reports that he has never smoked. He does not have any smokeless tobacco history on file.     OBJECTIVE:     Vital Signs (Most Recent):    Vital Signs  "Range (Last 24H):  BP: ()/()   Arterial Line BP: ()/()        There is no height or weight on file to calculate BMI.   Wt Readings from Last 4 Encounters:   08/21/23 90.1 kg (198 lb 10.2 oz)   08/09/23 87.7 kg (193 lb 5.5 oz)   01/26/23 88.9 kg (196 lb)   12/23/22 89.2 kg (196 lb 10.4 oz)       Significant Labs:  Lab Results   Component Value Date    WBC 6.17 12/23/2022    HGB 15.5 12/23/2022    HCT 47.6 12/23/2022     12/23/2022     12/23/2022    K 4.3 12/23/2022     12/23/2022    CREATININE 1.1 12/23/2022    BUN 16 12/23/2022    CO2 28 12/23/2022    GLU 94 12/23/2022    CALCIUM 9.4 12/23/2022    ALKPHOS 66 12/23/2022    ALT 21 12/23/2022    AST 15 12/23/2022    ALBUMIN 4.1 12/23/2022    HGBA1C 5.3 12/23/2022    BNP <10 03/31/2015     No LMP for male patient.  No results found for this or any previous visit (from the past 72 hour(s)).    EKG:   Results for orders placed or performed in visit on 08/09/23   EKG 12-lead    Collection Time: 08/09/23  1:53 PM    Narrative    Test Reason : R07.9,    Vent. Rate : 071 BPM     Atrial Rate : 071 BPM     P-R Int : 160 ms          QRS Dur : 082 ms      QT Int : 344 ms       P-R-T Axes : 057 059 045 degrees     QTc Int : 373 ms    Normal sinus rhythm  Nonspecific T wave abnormality  Abnormal ECG  When compared with ECG of 23-DEC-2022 10:27,  No significant change was found  Confirmed by GABI GUTIERREZ MD ([id number]) on 8/9/2023 3:26:45 PM    Referred By:  SHARLENE WHIPPLE           Confirmed By:GABI GUTIERREZ MD       TTE:  No results found for this or any previous visit.  No results found for: "EF"   Results for orders placed or performed during the hospital encounter of 04/14/15   2D echo with color flow doppler   Result Value Ref Range    EF + QEF 60 55 - 65    Diastolic Dysfunction No     Est. PA Systolic Pressure 26.04     Mitral Valve Mobility NORMAL     Tricuspid Valve Regurgitation TRIVIAL      [place]:  No results found for this or any previous visit.  Stress " "Test:  No results found for this or any previous visit.     LHC:  No results found for this or any previous visit.     PFT:  No results found for: "FEV1", "FVC", "XVB8HKA", "TLC", "DLCO"     ASSESSMENT/PLAN:        Pre-op Assessment    I have reviewed the Patient Summary Reports.     I have reviewed the Nursing Notes. I have reviewed the NPO Status.   I have reviewed the Medications.     Review of Systems  Anesthesia Hx:  No problems with previous Anesthesia             Denies Family Hx of Anesthesia complications.    Denies Personal Hx of Anesthesia complications.                    Hematology/Oncology:  Hematology Normal   Oncology Normal                                   EENT/Dental:  EENT/Dental Normal           Cardiovascular:      Valvular problems/murmurs: MVP.          hyperlipidemia                             Pulmonary:  Pulmonary Normal                       Renal/:  Renal/ Normal                 Hepatic/GI:  Hepatic/GI Normal                 Musculoskeletal:  Musculoskeletal Normal                Neurological:  Neurology Normal                                      Endocrine:  Endocrine Normal          Obesity / BMI > 30  Dermatological:  Skin Normal    Psych:  Psychiatric Normal                    Physical Exam  General: Well nourished, Alert and Oriented    Dental:  Intact        Anesthesia Plan  Type of Anesthesia, risks & benefits discussed:    Anesthesia Type: Gen Natural Airway  Intra-op Monitoring Plan: Standard ASA Monitors  Post Op Pain Control Plan: multimodal analgesia  Induction:  IV  Informed Consent: Informed consent signed with the Patient and all parties understand the risks and agree with anesthesia plan.  All questions answered. Patient consented to blood products? No  ASA Score: 2  Day of Surgery Review of History & Physical: H&P Update referred to the surgeon/provider.    Ready For Surgery From Anesthesia Perspective.     .      "

## 2024-03-12 NOTE — H&P
Short Stay Endoscopy History and Physical    PCP - Rakesh Dent MD  Referring Physician - Rakesh Dent MD  [address] [place] [address]    Procedure - Colonoscopy  ASA - per anesthesia  Mallampati - per anesthesia  History of Anesthesia problems - no  Family history Anesthesia problems -  no   Plan of anesthesia - General    HPI  48 y.o. male  Reason for procedure:   Encounter for colorectal cancer screening [Z12.11, Z12.12]         ROS:  Constitutional: No fevers, chills, No weight loss  CV: No chest pain  Pulm: No cough, No shortness of breath  GI: see HPI    Medical History:  has a past medical history of Elevated PSA, less than 10 ng/ml (12/23/2022).    Surgical History:  has no past surgical history on file.    Family History: family history is not on file..    Social History:  reports that he has never smoked. He does not have any smokeless tobacco history on file.    Review of patient's allergies indicates:  No Known Allergies    Medications:   Medications Prior to Admission   Medication Sig Dispense Refill Last Dose    atorvastatin (LIPITOR) 40 MG tablet Take 1 tablet (40 mg total) by mouth once daily. 90 tablet 3 3/11/2024       Physical Exam:    Vital Signs:   Vitals:    03/13/24 0716   BP: 126/84   Pulse: 83   Resp: 17   Temp: 97.9 °F (36.6 °C)       General Appearance: Well appearing in no acute distress  Abdomen: Soft, non tender, non distended with normal bowel sounds, no masses    Labs:  Lab Results   Component Value Date    WBC 6.17 12/23/2022    HGB 15.5 12/23/2022    HCT 47.6 12/23/2022     12/23/2022    CHOL 243 (H) 08/21/2023    TRIG 142 08/21/2023    HDL 34 (L) 08/21/2023    ALT 21 12/23/2022    AST 15 12/23/2022     12/23/2022    K 4.3 12/23/2022     12/23/2022    CREATININE 1.1 12/23/2022    BUN 16 12/23/2022    CO2 28 12/23/2022    TSH 0.755 12/23/2022    PSA 3.7 02/08/2023    HGBA1C 5.3 12/23/2022       I have explained the risks and  benefits of this endoscopic procedure to the patient including but not limited to bleeding, inflammation, infection, perforation, and death.    Assessment/Plan:     CRC Screening     - Proceed with colonoscopy     Sonia García MD  Gastroenterology   [facility]

## 2024-03-13 ENCOUNTER — HOSPITAL ENCOUNTER (OUTPATIENT)
Facility: HOSPITAL | Age: 49
Discharge: HOME | End: 2024-03-13
Attending: STUDENT IN AN ORGANIZED HEALTH CARE EDUCATION/TRAINING PROGRAM | Admitting: STUDENT IN AN ORGANIZED HEALTH CARE EDUCATION/TRAINING PROGRAM
Payer: COMMERCIAL

## 2024-03-13 ENCOUNTER — ANESTHESIA (OUTPATIENT)
Dept: ENDOSCOPY | Facility: HOSPITAL | Age: 49
End: 2024-03-13
Payer: COMMERCIAL

## 2024-03-13 VITALS
HEART RATE: 74 BPM | HEIGHT: 71 IN | TEMPERATURE: 98 F | BODY MASS INDEX: 27.44 KG/M2 | DIASTOLIC BLOOD PRESSURE: 87 MMHG | SYSTOLIC BLOOD PRESSURE: 129 MMHG | RESPIRATION RATE: 20 BRPM | WEIGHT: 196 LBS | OXYGEN SATURATION: 100 %

## 2024-03-13 DIAGNOSIS — Z12.11 COLON CANCER SCREENING: Primary | ICD-10-CM

## 2024-03-13 PROCEDURE — 94761 N-INVAS EAR/PLS OXIMETRY MLT: CPT

## 2024-03-13 PROCEDURE — 37000008 HC ANESTHESIA 1ST 15 MINUTES: Performed by: STUDENT IN AN ORGANIZED HEALTH CARE EDUCATION/TRAINING PROGRAM

## 2024-03-13 PROCEDURE — 88305 TISSUE EXAM BY PATHOLOGIST: CPT | Performed by: PATHOLOGY

## 2024-03-13 PROCEDURE — D9220A PRA ANESTHESIA: Mod: 33,,, | Performed by: NURSE ANESTHETIST, CERTIFIED REGISTERED

## 2024-03-13 PROCEDURE — 25000003 PHARM REV CODE 250: Performed by: NURSE ANESTHETIST, CERTIFIED REGISTERED

## 2024-03-13 PROCEDURE — 45385 COLONOSCOPY W/LESION REMOVAL: CPT | Mod: PT | Performed by: STUDENT IN AN ORGANIZED HEALTH CARE EDUCATION/TRAINING PROGRAM

## 2024-03-13 PROCEDURE — 88305 TISSUE EXAM BY PATHOLOGIST: CPT | Mod: 26,,, | Performed by: PATHOLOGY

## 2024-03-13 PROCEDURE — 37000009 HC ANESTHESIA EA ADD 15 MINS: Performed by: STUDENT IN AN ORGANIZED HEALTH CARE EDUCATION/TRAINING PROGRAM

## 2024-03-13 PROCEDURE — 27201089 HC SNARE, DISP (ANY): Performed by: STUDENT IN AN ORGANIZED HEALTH CARE EDUCATION/TRAINING PROGRAM

## 2024-03-13 PROCEDURE — 63600175 PHARM REV CODE 636 W HCPCS: Performed by: NURSE ANESTHETIST, CERTIFIED REGISTERED

## 2024-03-13 PROCEDURE — 99900035 HC TECH TIME PER 15 MIN (STAT)

## 2024-03-13 PROCEDURE — 45385 COLONOSCOPY W/LESION REMOVAL: CPT | Mod: 33,,, | Performed by: STUDENT IN AN ORGANIZED HEALTH CARE EDUCATION/TRAINING PROGRAM

## 2024-03-13 RX ORDER — LIDOCAINE HYDROCHLORIDE 20 MG/ML
INJECTION INTRAVENOUS
Status: DISCONTINUED | OUTPATIENT
Start: 2024-03-13 | End: 2024-03-13

## 2024-03-13 RX ORDER — PROPOFOL 10 MG/ML
VIAL (ML) INTRAVENOUS
Status: DISCONTINUED | OUTPATIENT
Start: 2024-03-13 | End: 2024-03-13

## 2024-03-13 RX ORDER — SODIUM CHLORIDE 9 MG/ML
INJECTION, SOLUTION INTRAVENOUS CONTINUOUS
Status: DISCONTINUED | OUTPATIENT
Start: 2024-03-13 | End: 2024-03-13 | Stop reason: HOSPADM

## 2024-03-13 RX ADMIN — GLYCOPYRROLATE 0.2 MG: 0.2 INJECTION, SOLUTION INTRAMUSCULAR; INTRAVENOUS at 08:03

## 2024-03-13 RX ADMIN — SODIUM CHLORIDE: 0.9 INJECTION, SOLUTION INTRAVENOUS at 08:03

## 2024-03-13 RX ADMIN — LIDOCAINE HYDROCHLORIDE 100 MG: 20 INJECTION INTRAVENOUS at 08:03

## 2024-03-13 RX ADMIN — PROPOFOL 80 MG: 10 INJECTION, EMULSION INTRAVENOUS at 08:03

## 2024-03-13 NOTE — PLAN OF CARE
Chart reviewed. Preop nursing care completed per orders. Safe surgery checklist complete. Pt denies any open wounds cuts or sores. Pt denies any metal in body or use of weight loss injections. Pt AAOX3, VSS on room air. Pt toileted, bed locked in lowest position, call light within reach. Pt denies any needs at this time. Belongings placed in locker #7.

## 2024-03-13 NOTE — ANESTHESIA POSTPROCEDURE EVALUATION
Anesthesia Post Evaluation    Patient: Josh Jimenez Jr.    Procedure(s) Performed: Procedure(s) (LRB):  COLONOSCOPY (N/A)    Final Anesthesia Type: general      Patient location during evaluation: GI PACU  Patient participation: Yes- Able to Participate  Level of consciousness: awake and alert and oriented  Post-procedure vital signs: reviewed and stable  Pain management: adequate  Airway patency: patent    PONV status at discharge: No PONV  Anesthetic complications: no      Cardiovascular status: hemodynamically stable  Respiratory status: unassisted  Hydration status: euvolemic  Follow-up not needed.              Vitals Value Taken Time   /87 03/13/24 0855   Temp 36.6 °C (97.8 °F) 03/13/24 0823   Pulse 74 03/13/24 0855   Resp 20 03/13/24 0855   SpO2 100 % 03/13/24 0855         Event Time   Out of Recovery 08:38:00         Pain/Ignacio Score: Ignacio Score: 10 (3/13/2024  8:38 AM)

## 2024-03-13 NOTE — TRANSFER OF CARE
"Anesthesia Transfer of Care Note    Patient: Josh Jimenez Jr.    Procedure(s) Performed: Procedure(s) (LRB):  COLONOSCOPY (N/A)    Patient location: PACU    Anesthesia Type: general    Transport from OR: Transported from OR on room air with adequate spontaneous ventilation    Post pain: adequate analgesia    Post assessment: no apparent anesthetic complications and tolerated procedure well    Post vital signs: stable    Level of consciousness: sedated    Nausea/Vomiting: no nausea/vomiting    Complications: none    Transfer of care protocol was followed      Last vitals: Visit Vitals  /84 (BP Location: Left arm, Patient Position: Lying)   Pulse 83   Temp 36.6 °C (97.9 °F)   Resp 17   Ht 5' 10.5" (1.791 m)   Wt 88.9 kg (196 lb)   SpO2 97%   BMI 27.73 kg/m²     "

## 2024-03-13 NOTE — PROVATION PATIENT INSTRUCTIONS
Discharge Summary/Instructions after an Endoscopic Procedure  Patient Name: Josh Jimenez  Patient MRN: [id number]  Patient YOB: 1975 Wednesday, March 13, 2024  Sonia García MD  Dear patient,  As a result of recent federal legislation (The Federal Cures Act), you may   receive lab or pathology results from your procedure in your [facility]   account before your physician is able to contact you. Your physician or   their representative will relay the results to you with their   recommendations at their soonest availability.  Thank you,  RESTRICTIONS:  During your procedure today, you received medications for sedation.  These   medications may affect your judgment, balance and coordination.  Therefore,   for 24 hours, you have the following restrictions:   - DO NOT drive a car, operate machinery, make legal/financial decisions,   sign important papers or drink alcohol.    ACTIVITY:  Today: no heavy lifting, straining or running due to procedural   sedation/anesthesia.  The following day: return to full activity including work.  DIET:  Eat and drink normally unless instructed otherwise.     TREATMENT FOR COMMON SIDE EFFECTS:  - Mild abdominal pain, nausea, belching, bloating or excessive gas:  rest,   eat lightly and use a heating pad.  - Sore Throat: treat with throat lozenges and/or gargle with warm salt   water.  - Because air was used during the procedure, expelling large amounts of air   from your rectum or belching is normal.  - If a bowel prep was taken, you may not have a bowel movement for 1-3 days.    This is normal.  SYMPTOMS TO WATCH FOR AND REPORT TO YOUR PHYSICIAN:  1. Abdominal pain or bloating, other than gas cramps.  2. Chest pain.  3. Back pain.  4. Signs of infection such as: chills or fever occurring within 24 hours   after the procedure.  5. Rectal bleeding, which would show as bright red, maroon, or black stools.   (A tablespoon of blood from the rectum is not serious, especially if    hemorrhoids are present.)  6. Vomiting.  7. Weakness or dizziness.  GO DIRECTLY TO THE NEAREST EMERGENCY ROOM IF YOU HAVE ANY OF THE FOLLOWING:      Difficulty breathing              Chills and/or fever over 101 F   Persistent vomiting and/or vomiting blood   Severe abdominal pain   Severe chest pain   Black, tarry stools   Bleeding- more than one tablespoon   Any other symptom or condition that you feel may need urgent attention  Your doctor recommends these additional instructions:  If any biopsies were taken, your doctors clinic will contact you in 1 to 2   weeks with any results.  - Discharge patient to home (ambulatory).   - Resume regular diet.   - Continue present medications.   - Await pathology results.   - Repeat colonoscopy in 7 years for surveillance.  For questions, problems or results please call your physician - Sonia García MD at Work:  [phone].  [facility], EMERGENCY ROOM PHONE NUMBER: [phone]  IF A COMPLICATION OR EMERGENCY SITUATION ARISES AND YOU ARE UNABLE TO REACH   YOUR PHYSICIAN - GO DIRECTLY TO THE EMERGENCY ROOM.  Sonia García MD  3/13/2024 8:28:21 AM  This report has been verified and signed electronically.  Dear patient,  As a result of recent federal legislation (The Federal Cures Act), you may   receive lab or pathology results from your procedure in your [facility]   account before your physician is able to contact you. Your physician or   their representative will relay the results to you with their   recommendations at their soonest availability.  Thank you,  PROVATION

## 2024-03-15 LAB
FINAL PATHOLOGIC DIAGNOSIS: NORMAL
GROSS: NORMAL
Lab: NORMAL

## 2024-04-07 ENCOUNTER — PATIENT MESSAGE (OUTPATIENT)
Dept: INTERNAL MEDICINE | Facility: CLINIC | Age: 49
End: 2024-04-07
Payer: COMMERCIAL

## 2024-04-08 RX ORDER — CLOBETASOL PROPIONATE 0.5 MG/G
OINTMENT TOPICAL 2 TIMES DAILY
Qty: 60 G | Refills: 1 | Status: SHIPPED | OUTPATIENT
Start: 2024-04-08

## 2024-04-16 ENCOUNTER — PATIENT MESSAGE (OUTPATIENT)
Dept: INTERNAL MEDICINE | Facility: CLINIC | Age: 49
End: 2024-04-16
Payer: COMMERCIAL

## 2024-04-23 ENCOUNTER — OFFICE VISIT (OUTPATIENT)
Dept: INTERNAL MEDICINE | Facility: CLINIC | Age: 49
End: 2024-04-23
Payer: COMMERCIAL

## 2024-04-23 VITALS
OXYGEN SATURATION: 98 % | BODY MASS INDEX: 27.97 KG/M2 | SYSTOLIC BLOOD PRESSURE: 126 MMHG | HEIGHT: 71 IN | WEIGHT: 199.75 LBS | DIASTOLIC BLOOD PRESSURE: 74 MMHG | HEART RATE: 82 BPM

## 2024-04-23 DIAGNOSIS — R42 DIZZINESS: Primary | ICD-10-CM

## 2024-04-23 DIAGNOSIS — R06.83 SNORING: ICD-10-CM

## 2024-04-23 DIAGNOSIS — M67.431 GANGLION CYST OF DORSUM OF RIGHT WRIST: ICD-10-CM

## 2024-04-23 PROCEDURE — 99214 OFFICE O/P EST MOD 30 MIN: CPT | Mod: S$GLB,,, | Performed by: FAMILY MEDICINE

## 2024-04-23 PROCEDURE — 99999 PR PBB SHADOW E&M-EST. PATIENT-LVL IV: CPT | Mod: PBBFAC,,, | Performed by: FAMILY MEDICINE

## 2024-04-23 NOTE — PROGRESS NOTES
Subjective:       Patient ID: Josh Jimenez Jr. is a 48 y.o. male.    Chief Complaint: Dizziness (2 weeks )    HPI    Josh Jimenez Jr. is a 48 y.o. male for prob visit.      C/o dizziness. Worse when laying down. Over a two wk period. Started using oral solution for teeth whitening around that time and has since limited use, symptoms seems to be mildly improving. Also wondering if statin is contributing. C/o blurry vision on occasion too, has plans to see est eye doctor soon.   Denies palps, chest pains, congestion    Has started using mouthguard for witness apneic episodes by wife, says this is helping. Requesting sleep study     #Cards: HLD  - est w/ Dr. Romero / Dr. Solomon,  8/2023  - reg: Lipitor 40 qd  - stress echo 8/2023, wnl     #Ortho: Lt thumb pain  - est w/ Dr. Baires,  1/2023     #BMI 27  - rides bike, does pushups    Review of Systems   Constitutional:  Negative for chills and fever.   HENT:  Negative for congestion.    Eyes:  Positive for visual disturbance. Negative for pain, redness and itching.   Respiratory:  Negative for shortness of breath.    Cardiovascular:  Negative for chest pain and palpitations.   Neurological:  Positive for dizziness.         Past Medical History:   Diagnosis Date    Elevated PSA, less than 10 ng/ml 12/23/2022        Prior to Admission medications    Medication Sig Start Date End Date Taking? Authorizing Provider   atorvastatin (LIPITOR) 40 MG tablet Take 1 tablet (40 mg total) by mouth once daily. 8/21/23 8/20/24  Fadi Romero MD   clobetasol 0.05% (TEMOVATE) 0.05 % Oint Apply topically 2 (two) times daily. 4/8/24   Rakesh Dent MD        Past medical history, surgical history, and family medical history reviewed and updated as appropriate.    Medications and allergies reviewed.     Objective:          Vitals:    04/23/24 1019   BP: 126/74   BP Location: Left arm   Patient Position: Sitting   BP Method: Large (Manual)   Pulse: 82   SpO2: 98%  "  Weight: 90.6 kg (199 lb 11.8 oz)   Height: 5' 10.5" (1.791 m)     Body mass index is 28.25 kg/m².  Physical Exam  Vitals and nursing note reviewed.   Constitutional:       General: He is not in acute distress.     Appearance: He is not ill-appearing, toxic-appearing or diaphoretic.   Eyes:      Extraocular Movements: Extraocular movements intact.      Pupils: Pupils are equal, round, and reactive to light.   Cardiovascular:      Rate and Rhythm: Normal rate and regular rhythm.      Pulses: Normal pulses.      Heart sounds: Normal heart sounds. No murmur heard.  Pulmonary:      Effort: Pulmonary effort is normal. No respiratory distress.      Breath sounds: Normal breath sounds. No wheezing.   Musculoskeletal:        Arms:       Comments: Mobile fluid filled cyst dorsum wrist, nontender   Neurological:      Mental Status: He is alert and oriented to person, place, and time.   Psychiatric:         Mood and Affect: Mood normal.         Behavior: Behavior normal.         Lab Results   Component Value Date    WBC 6.17 12/23/2022    HGB 15.5 12/23/2022    HCT 47.6 12/23/2022     12/23/2022    CHOL 243 (H) 08/21/2023    TRIG 142 08/21/2023    HDL 34 (L) 08/21/2023    ALT 21 12/23/2022    AST 15 12/23/2022     12/23/2022    K 4.3 12/23/2022     12/23/2022    CREATININE 1.1 12/23/2022    BUN 16 12/23/2022    CO2 28 12/23/2022    TSH 0.755 12/23/2022    PSA 3.7 02/08/2023    HGBA1C 5.3 12/23/2022       Assessment:       1. Dizziness    2. Snoring    3. Ganglion cyst of dorsum of right wrist          Plan:   1. Dizziness  -     US Carotid Bilateral; Future; Expected date: 04/23/2024  -     Ambulatory referral/consult to Orthopedics; Future; Expected date: 04/23/2024    2. Snoring  -     Ambulatory referral/consult to Sleep Disorders; Future; Expected date: 04/30/2024    3. Ganglion cyst of dorsum of right wrist        Health maintenance reviewed with patient.     Follow up in about 3 weeks (around " 5/14/2024) for Virtual checkup.    Rakesh Dent MD  Family Medicine / Primary Care  [facility]  4/23/2024

## 2024-05-02 ENCOUNTER — PATIENT OUTREACH (OUTPATIENT)
Dept: ADMINISTRATIVE | Facility: HOSPITAL | Age: 49
End: 2024-05-02
Payer: COMMERCIAL

## 2024-05-02 ENCOUNTER — HOSPITAL ENCOUNTER (OUTPATIENT)
Dept: RADIOLOGY | Facility: HOSPITAL | Age: 49
Discharge: HOME | End: 2024-05-02
Attending: FAMILY MEDICINE
Payer: COMMERCIAL

## 2024-05-02 ENCOUNTER — PATIENT MESSAGE (OUTPATIENT)
Dept: INTERNAL MEDICINE | Facility: CLINIC | Age: 49
End: 2024-05-02
Payer: COMMERCIAL

## 2024-05-02 DIAGNOSIS — R42 DIZZINESS: ICD-10-CM

## 2024-05-02 PROCEDURE — 93880 EXTRACRANIAL BILAT STUDY: CPT | Mod: 26,,, | Performed by: RADIOLOGY

## 2024-05-02 PROCEDURE — 93880 EXTRACRANIAL BILAT STUDY: CPT | Mod: TC

## 2024-05-02 RX ORDER — SCOLOPAMINE TRANSDERMAL SYSTEM 1 MG/1
1 PATCH, EXTENDED RELEASE TRANSDERMAL
Qty: 10 PATCH | Refills: 0 | Status: SHIPPED | OUTPATIENT
Start: 2024-05-02

## 2024-05-16 ENCOUNTER — OFFICE VISIT (OUTPATIENT)
Dept: INTERNAL MEDICINE | Facility: CLINIC | Age: 49
End: 2024-05-16
Payer: COMMERCIAL

## 2024-05-16 DIAGNOSIS — E78.2 MIXED HYPERLIPIDEMIA: ICD-10-CM

## 2024-05-16 DIAGNOSIS — R42 DIZZINESS: Primary | ICD-10-CM

## 2024-05-16 DIAGNOSIS — R06.83 SNORING: ICD-10-CM

## 2024-05-16 PROCEDURE — 99214 OFFICE O/P EST MOD 30 MIN: CPT | Mod: 95,,, | Performed by: FAMILY MEDICINE

## 2024-05-16 RX ORDER — ATORVASTATIN CALCIUM 20 MG/1
20 TABLET, FILM COATED ORAL DAILY
Qty: 90 TABLET | Refills: 3 | Status: SHIPPED | OUTPATIENT
Start: 2024-05-16 | End: 2025-05-16

## 2024-05-16 NOTE — PROGRESS NOTES
Subjective:       Patient ID: Josh Jimenez Jr. is a 48 y.o. male.    Chief Complaint: No chief complaint on file.    HPI  The patient location is: [place]  The chief complaint leading to consultation is: checkup    Visit type: audiovisual    Face to Face time with patient: 10 min   30 minutes of total time spent on the encounter, which includes face to face time and non-face to face time preparing to see the patient (eg, review of tests), Obtaining and/or reviewing separately obtained history, Documenting clinical information in the electronic or other health record, Independently interpreting results (not separately reported) and communicating results to the patient/family/caregiver, or Care coordination (not separately reported).         Each patient to whom he or she provides medical services by telemedicine is:  (1) informed of the relationship between the physician and patient and the respective role of any other health care provider with respect to management of the patient; and (2) notified that he or she may decline to receive medical services by telemedicine and may withdraw from such care at any time.    Notes:     Josh Jimenez Jr. is a 48 y.o. male for virtual.     Dizzy spells seem to be going away    Has been holding lipitor 40 since   Restart lipitor but at lower dose 5/16/24     Has started using mouthguard for witness apneic episodes by wife, says this is helping. Requesting sleep study     #Cards: HLD  - est w/ Dr. Romero / Dr. Solomon,  8/2023  - reg: Lipitor 40 qd  - stress echo 8/2023, wnl  - carotid u/s 5/2024     #Ortho: Lt thumb pain  - est w/ Dr. Baires,  1/2023     #BMI 27  - rides bike, does pushups    Review of Systems   Constitutional:  Negative for activity change and unexpected weight change.   HENT:  Negative for hearing loss, rhinorrhea and trouble swallowing.    Eyes:  Negative for discharge and visual disturbance.   Respiratory:  Negative for chest tightness and wheezing.     Cardiovascular:  Negative for chest pain and palpitations.   Gastrointestinal:  Negative for blood in stool, constipation, diarrhea and vomiting.   Endocrine: Negative for polydipsia and polyuria.   Genitourinary:  Negative for difficulty urinating, hematuria and urgency.   Musculoskeletal:  Negative for arthralgias, joint swelling and neck pain.   Neurological:  Negative for weakness and headaches.   Psychiatric/Behavioral:  Negative for confusion and dysphoric mood.          Past Medical History:   Diagnosis Date    Elevated PSA, less than 10 ng/ml 12/23/2022        Prior to Admission medications    Medication Sig Start Date End Date Taking? Authorizing Provider   atorvastatin (LIPITOR) 40 MG tablet Take 1 tablet (40 mg total) by mouth once daily. 8/21/23 8/20/24  Fadi Romero MD   clobetasol 0.05% (TEMOVATE) 0.05 % Oint Apply topically 2 (two) times daily. 4/8/24   Rakesh Dent MD   scopolamine (TRANSDERM-SCOP) 1.3-1.5 mg (1 mg over 3 days) Place 1 patch onto the skin every 72 hours. 5/2/24   Rakesh Dent MD        Past medical history, surgical history, and family medical history reviewed and updated as appropriate.    Medications and allergies reviewed.     Objective:          There were no vitals filed for this visit.  There is no height or weight on file to calculate BMI.  Physical Exam  Constitutional:       General: He is not in acute distress.     Appearance: Normal appearance.   Eyes:      Extraocular Movements: Extraocular movements intact.   Pulmonary:      Effort: Pulmonary effort is normal. No respiratory distress.   Neurological:      Mental Status: He is alert and oriented to person, place, and time.   Psychiatric:         Mood and Affect: Mood normal.         Behavior: Behavior normal.         Lab Results   Component Value Date    WBC 6.17 12/23/2022    HGB 15.5 12/23/2022    HCT 47.6 12/23/2022     12/23/2022    CHOL 243 (H) 08/21/2023    TRIG 142 08/21/2023    HDL 34 (L)  08/21/2023    ALT 21 12/23/2022    AST 15 12/23/2022     12/23/2022    K 4.3 12/23/2022     12/23/2022    CREATININE 1.1 12/23/2022    BUN 16 12/23/2022    CO2 28 12/23/2022    TSH 0.755 12/23/2022    PSA 3.7 02/08/2023    HGBA1C 5.3 12/23/2022       Assessment:       1. Dizziness    2. Snoring    3. Mixed hyperlipidemia          Plan:   1. Dizziness    2. Snoring    3. Mixed hyperlipidemia  Overview:  Hold lipitor 40 4/2024  - restart lipitor but at 20mg dose 5/2024, keep in touch about any side effects      Other orders  -     atorvastatin (LIPITOR) 20 MG tablet; Take 1 tablet (20 mg total) by mouth once daily.  Dispense: 90 tablet; Refill: 3        Health maintenance reviewed with patient.     Follow up in about 3 months (around 8/16/2024) for Medication Checkup.    Rakesh Dent MD  Family Medicine / Primary Care  [facility]  5/16/2024

## 2024-05-24 ENCOUNTER — PATIENT MESSAGE (OUTPATIENT)
Dept: INTERNAL MEDICINE | Facility: CLINIC | Age: 49
End: 2024-05-24
Payer: COMMERCIAL

## 2024-06-18 ENCOUNTER — OFFICE VISIT (OUTPATIENT)
Dept: SLEEP MEDICINE | Facility: CLINIC | Age: 49
End: 2024-06-18
Payer: COMMERCIAL

## 2024-06-18 DIAGNOSIS — G47.19 EXCESSIVE DAYTIME SLEEPINESS: ICD-10-CM

## 2024-06-18 DIAGNOSIS — F51.09 OTHER INSOMNIA NOT DUE TO A SUBSTANCE OR KNOWN PHYSIOLOGICAL CONDITION: ICD-10-CM

## 2024-06-18 DIAGNOSIS — R06.83 SNORING: ICD-10-CM

## 2024-06-18 DIAGNOSIS — R35.1 NOCTURIA: ICD-10-CM

## 2024-06-18 DIAGNOSIS — R06.81 WITNESSED EPISODE OF APNEA: Primary | ICD-10-CM

## 2024-06-18 PROCEDURE — 99204 OFFICE O/P NEW MOD 45 MIN: CPT | Mod: 95,,, | Performed by: PHYSICIAN ASSISTANT

## 2024-06-18 NOTE — PROGRESS NOTES
The chief complaint leading to consultation is: snoring     Visit type: audiovisual     The patient location is: [place]     11 minutes of total time spent on the encounter, which includes face to face time and non-face to face time preparing to see the patient (eg, review of tests), Obtaining and/or reviewing separately obtained history, Documenting clinical information in the electronic or other health record, Independently interpreting results (not separately reported) and communicating results to the patient/family/caregiver, or Care coordination (not separately reported).      Each patient to whom he or she provides medical services by telemedicine is:  (1) informed of the relationship between the physician and patient and the respective role of any other health care provider with respect to management of the patient; and (2) notified that he or she may decline to receive medical services by telemedicine and may withdraw from such care at any time.        Referred by Rakesh Dent MD     NEW PATIENT VISIT    Josh Jimenez Jr.  is a pleasant 48 y.o. male  with PMH significant for HLD, MVP, BMI 28+ who presents for sleep evaluation following referral from PCP      C/o snoring, witnessed apneas per wife, poor disrupted and un-refreshing sleep, and excessive daytime sleepiness and fitigue. Reports frequent accidental dozing. State he starting wearing a mouth guard, and wife reports improvement in snoring and accidental dozing frequency since using mouth guard. However, she reports still noticing apneas with device in place. States PCP recommended evaluation for OSCAR, which is why he presents today    SLEEP SCHEDULE   Environment    Bed Time 10-11PM   Sleep Latency 10mins   Arousals 2   Nocturia 2   Back to sleep 10mins   Wake time 6-7AM   Naps Daily nap   Work          6/18/2024     5:37 PM   Sleep Clinic ROS    Difficulty breathing through the nose?  No   Sore throat or dry mouth in the morning? Sometimes  "  Irregular or very fast heart beat?  No   Shortness of breath?  Sometimes   Acid reflux? No   Body aches and pains?  Yes   Morning headaches? Sometimes   Dizziness? Sometimes   Mood changes?  Sometimes   Do you exercise?  No   Do you feel like moving your legs a lot?  No       Past Medical History:   Diagnosis Date    Elevated PSA, less than 10 ng/ml 12/23/2022     Patient Active Problem List   Diagnosis    MVP (mitral valve prolapse)    Cyst of finger    Dermatitis    Injury by nail    Right groin pain    Left-sided chest pain    Chronic thumb pain, left    Family history of prostate cancer    Overweight (BMI 25.0-29.9)    Mixed hyperlipidemia    [place] cardiac risk <10% in next 10 years    Ganglion cyst of dorsum of right wrist    Snoring       Current Outpatient Medications:     atorvastatin (LIPITOR) 20 MG tablet, Take 1 tablet (20 mg total) by mouth once daily., Disp: 90 tablet, Rfl: 3    clobetasol 0.05% (TEMOVATE) 0.05 % Oint, Apply topically 2 (two) times daily., Disp: 60 g, Rfl: 1    scopolamine (TRANSDERM-SCOP) 1.3-1.5 mg (1 mg over 3 days), Place 1 patch onto the skin every 72 hours., Disp: 10 patch, Rfl: 0     There were no vitals filed for this visit.    Physical Exam:    GEN:   Well-appearing  Psych:  Appropriate affect, demonstrates insight  SKIN:  No rash on the face or bridge of the nose      LABS:   No results found for: "HGB", "CO2"      RECORDS REVIEWED:    No previous sleep study    ASSESSMENT        6/18/2024     5:33 PM   EPWORTH SLEEPINESS SCALE   Sitting and reading 2   Watching TV 2   Sitting, inactive in a public place (e.g. a theatre or a meeting) 0   As a passenger in a car for an hour without a break 1   Lying down to rest in the afternoon when circumstances permit 1   Sitting and talking to someone 0   Sitting quietly after a lunch without alcohol 1   In a car, while stopped for a few minutes in traffic 0   Total score 7       PROBLEM DESCRIPTION/ Sx on Presentation  STATUS   Sx " OSCAR   + snoring, + occasional snoring, + witnessed apneas per wife  + wakes feeling un-refreshed   Using mouth guard with some improvements in daytime dozing and snoring  New   Daytime Sx   + sleepiness when inactive   Daily nap, frequent accidental dozing  Denies drowsiness when driving   ESS 7/24 on intake  New   Insomnia   Trouble falling asleep: no issues  Arousals:         2 x nightly for nocturia  Hard to get back to sleep?: no issues    Prior pertinent medications:  Current pertinent medications:   New   Nocturia   x 2 per sleep period  New   Other issues:       PLAN      -recommend sleep testing   -HST ordered  -discussed trial therapy if OSCAR present and the patient is open to a trial of CPAP therapy  -discussed OSCAR and PAP with patient in detail, including possible complications of untreated OSCAR like heart attack/stroke  -advised on strict driving precautions; advised never to drive drowsy     Advised on plan of care. Answered all patient questions. Patient verbalized understanding and voiced agreement with plan of care.     RTC if dx of OSCAR made and CPAP ordered, will need follow up 31-90 days after receiving machine for compliance         The patient was given open opportunity to ask questions and/or express concerns about treatment plan.   All questions/concerns were discussed.     Two patient identifiers used prior to evaluation.

## 2024-07-01 ENCOUNTER — PATIENT MESSAGE (OUTPATIENT)
Dept: SLEEP MEDICINE | Facility: CLINIC | Age: 49
End: 2024-07-01
Payer: COMMERCIAL

## 2024-07-02 ENCOUNTER — HOSPITAL ENCOUNTER (OUTPATIENT)
Dept: SLEEP MEDICINE | Facility: HOSPITAL | Age: 49
Discharge: HOME | End: 2024-07-02
Attending: PHYSICIAN ASSISTANT
Payer: COMMERCIAL

## 2024-07-02 DIAGNOSIS — R35.1 NOCTURIA: ICD-10-CM

## 2024-07-02 DIAGNOSIS — R06.83 SNORING: ICD-10-CM

## 2024-07-02 DIAGNOSIS — G47.19 EXCESSIVE DAYTIME SLEEPINESS: ICD-10-CM

## 2024-07-02 DIAGNOSIS — F51.09 OTHER INSOMNIA NOT DUE TO A SUBSTANCE OR KNOWN PHYSIOLOGICAL CONDITION: ICD-10-CM

## 2024-07-02 DIAGNOSIS — R06.81 WITNESSED EPISODE OF APNEA: ICD-10-CM

## 2024-07-02 PROCEDURE — 95800 SLP STDY UNATTENDED: CPT

## 2024-07-02 NOTE — PROGRESS NOTES
The patient ID was verified. He  was instructed on how to turn the Home Sleep Testing device on and off, how to apply the sensors (Watch Pat). He  was encouraged to sleep on supine position and must have 6 hours of sleep. The patient was instructed not to get the device wet and return it back to us. All questions were answered prior to patient leaving. He was provided the after visit summary.

## 2024-07-03 PROBLEM — R06.81 WITNESSED EPISODE OF APNEA: Status: ACTIVE | Noted: 2024-07-03

## 2024-07-03 NOTE — PROGRESS NOTES
[facility] Initial Evaluation  Subjective:  Pt reports she has pain in the hip on the R side in the anterior region, over the hip flexors. States she gets a catching and pulling sensation in the musculature on the R side. Pain has been present since August of 2019, initially felt as a HS pull. The pain moved anterior. She then was treated for SI joint issues and hip pain which alleviated, but continued with intermittent pain with increased activity. Walking is limited to one mile on the treadmill. Pt with referral for PT evaluate and treat dated 2/26/2020. Original injury was a fall on a wet floor with one leg out in front of her.     The history is provided by the patient. No  was used.   Therapist Generated HPI Evaluation         Type of problem:  Right hip.    This is a chronic condition.  Condition occurred with:  A fall/slip.  Where condition occurred: at home.  Patient reports pain:  Anterior.  Pain is described as aching (tightness and pulling ) and is intermittent.  Pain radiates to:  No radiation. Pain is worse in the P.M..  Since onset symptoms are unchanged.  Associated symptoms:  Loss of motion/stiffness. Symptoms are exacerbated by activity and walking  and relieved by other (prior PT exercises ).  Special tests included:  X-ray (Very mild arthritic changes ).  Previous treatment includes physical therapy. There was mild improvement following previous treatment.  Restrictions due to condition include:  Working in normal job without restrictions.  Barriers include:  None as reported by patient.    Patient Health History         Pain score: Varies 0-4/10.  General health as reported by patient is good.  Pertinent medical history includes: thyroid problems.     Medical allergies: none.   Surgeries include:  None.    Current medications:  Thyroid medication and hormone replacement therapy.    Current occupation is sales retail.   Primary job tasks include:  Prolonged  The HSAT device (Watch Pat) was  uploaded this morning by the patient The recorded sleep data noted to be adequate and the patient did not report issue with the device during the study.   standing and repetitive tasks.                                    Objective:  System                                           Hip Evaluation  HIP AROM:  AROM:    Left Hip:     Normal (except extension lacking 13 deg to neutral )    Right Hip:   Normal (except extension lacking 13 degrees to neutral )                    Hip Strength:  Hip Strength:    Left:    Normal  Right:  Normal                          Hip Special Testing:   Special tests hip not assessed: SI joint with level landmarks and symmetrical motion.     Right hip positive for the following special tests:  ThomasRight hip negative for the following special tests:  Piriformis; Luis; Fadir/Labrum; SLR; Jarad's or Femoral Nerve    Hip Palpation:      Right hip tenderness present at:  hip flexors and Piriformis  Right hip tenderness not present at:  Ischial Tuberosity; Greater Trachanter; IT Band; PSIS; ASIS; Abductors; Iliac Crest; Gluteus Medius or Bursa             General     ROS    Assessment/Plan:    Patient is a 56 year old female with right side hip complaints.    Patient has the following significant findings with corresponding treatment plan.                Diagnosis 1:  R hip pain  Pain -  hot/cold therapy, manual therapy, self management, education, directional preference exercise and home program  Decreased ROM/flexibility - manual therapy, therapeutic exercise and home program  Inflammation - cold therapy and self management/home program  Impaired gait - gait training and home program  Impaired muscle performance - neuro re-education and home program  Decreased function - therapeutic activities and home program    Therapy Evaluation Codes:   1) History comprised of:   Personal factors that impact the plan of care:      Time since onset of symptoms.    Comorbidity factors that impact the plan of care are:      None.     Medications impacting care: None.  2) Examination of Body Systems comprised of:   Body structures and functions that impact  the plan of care:      Hip.   Activity limitations that impact the plan of care are:      Running, Squatting/kneeling and Walking.  3) Clinical presentation characteristics are:   Stable/Uncomplicated.  4) Decision-Making    Low complexity using standardized patient assessment instrument and/or measureable assessment of functional outcome.  Cumulative Therapy Evaluation is: Low complexity.    Previous and current functional limitations:  (See Goal Flow Sheet for this information)    Short term and Long term goals: (See Goal Flow Sheet for this information)     Communication ability:  Patient appears to be able to clearly communicate and understand verbal and written communication and follow directions correctly.  Treatment Explanation - The following has been discussed with the patient:   RX ordered/plan of care  Anticipated outcomes  Possible risks and side effects  This patient would benefit from PT intervention to resume normal activities.   Rehab potential is good.    Frequency:  1 X week, once daily  Duration:  for 8 weeks  Discharge Plan:  Achieve all LTG.  Independent in home treatment program.  Reach maximal therapeutic benefit.    Please refer to the daily flowsheet for treatment today, total treatment time and time spent performing 1:1 timed codes.

## 2024-07-08 ENCOUNTER — PATIENT MESSAGE (OUTPATIENT)
Dept: INTERNAL MEDICINE | Facility: CLINIC | Age: 49
End: 2024-07-08
Payer: COMMERCIAL

## 2024-07-08 ENCOUNTER — PATIENT MESSAGE (OUTPATIENT)
Dept: SLEEP MEDICINE | Facility: CLINIC | Age: 49
End: 2024-07-08
Payer: COMMERCIAL

## 2024-07-08 DIAGNOSIS — G47.33 OSA (OBSTRUCTIVE SLEEP APNEA): ICD-10-CM

## 2024-07-08 DIAGNOSIS — G47.33 OSA (OBSTRUCTIVE SLEEP APNEA): Primary | ICD-10-CM

## 2024-07-08 DIAGNOSIS — R06.83 SNORING: Primary | ICD-10-CM

## 2024-07-08 NOTE — TELEPHONE ENCOUNTER
Please see pt's message re: sleep study test results. Pt is also requesting a referral to ENT. Please advise.

## 2024-07-08 NOTE — TELEPHONE ENCOUNTER
----- Message from DUSTY Hoff sent at 7/31/2023 11:41 AM CDT -----  Please call, needs repeat pap in one year   CPAP and supplies ordered

## 2024-08-21 ENCOUNTER — OFFICE VISIT (OUTPATIENT)
Dept: OTOLARYNGOLOGY | Facility: CLINIC | Age: 49
End: 2024-08-21
Payer: COMMERCIAL

## 2024-08-21 DIAGNOSIS — R06.83 SNORING: ICD-10-CM

## 2024-08-21 DIAGNOSIS — G47.33 OSA (OBSTRUCTIVE SLEEP APNEA): ICD-10-CM

## 2024-08-21 PROCEDURE — 99999 PR PBB SHADOW E&M-EST. PATIENT-LVL II: CPT | Mod: PBBFAC,,, | Performed by: OTOLARYNGOLOGY

## 2024-08-21 PROCEDURE — 99204 OFFICE O/P NEW MOD 45 MIN: CPT | Mod: S$GLB,,, | Performed by: OTOLARYNGOLOGY
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
## 2024-10-21 ENCOUNTER — OFFICE VISIT (OUTPATIENT)
Dept: SLEEP MEDICINE | Facility: CLINIC | Age: 49
End: 2024-10-21
Payer: COMMERCIAL

## 2024-10-21 DIAGNOSIS — G47.33 OSA (OBSTRUCTIVE SLEEP APNEA): Primary | ICD-10-CM

## 2024-10-21 PROCEDURE — 99214 OFFICE O/P EST MOD 30 MIN: CPT | Mod: 95,,, | Performed by: PHYSICIAN ASSISTANT

## 2024-10-21 NOTE — PROGRESS NOTES
The chief complaint leading to consultation is: OSCAR     Visit type: audiovisual     The patient location is: [place]     14 minutes of total time spent on the encounter, which includes face to face time and non-face to face time preparing to see the patient (eg, review of tests), Obtaining and/or reviewing separately obtained history, Documenting clinical information in the electronic or other health record, Independently interpreting results (not separately reported) and communicating results to the patient/family/caregiver, or Care coordination (not separately reported).      Each patient to whom he or she provides medical services by telemedicine is:  (1) informed of the relationship between the physician and patient and the respective role of any other health care provider with respect to management of the patient; and (2) notified that he or she may decline to receive medical services by telemedicine and may withdraw from such care at any time.        Referred by No ref. provider found     ESTABLISHED PATIENT VISIT    Josh Jimenez Jr.  is a pleasant 48 y.o. male  with PMH significant for HLD, MVP, BMI 28+, OSCAR      Here today for: CPAP follow-up     Last visit 6/18/24:   C/o snoring, witnessed apneas per wife, poor disrupted and un-refreshing sleep, and excessive daytime sleepiness and fitigue. Reports frequent accidental dozing. State he starting wearing a mouth guard, and wife reports improvement in snoring and accidental dozing frequency since using mouth guard. However, she reports still noticing apneas with device in place. States PCP recommended evaluation for OSCAR, which is why he presents today.  PLAN:  -recommend sleep testing   -HST ordered  -discussed trial therapy if OSCAR present and the patient is open to a trial of CPAP therapy  -discussed OSCAR and PAP with patient in detail, including possible complications of untreated OSCAR like heart attack/stroke  -advised on strict driving precautions; advised  never to drive drowsy    Since last visit:   Using CPAP nightly with significant improvement in sx. Reports he is sleeping so much better and is no longer constantly tired throughout the day. Reports mask interface and pressure settings are comfortable. No complaints at this time. Presents today for compliance visit.      PAP history   Problems    Mask FFM   Pressure 6-12cwp   DME HME   Machine age AirSense 11 7/17/24   Download 10/21/24: 30/30 x 5hrs 52mins, 6-12cwp (6.7/8.6/9.6), leak (0.2/7.8/17.2), AHI 4       SLEEP SCHEDULE   Environment    Bed Time 10-11PM   Sleep Latency 10mins   Arousals 2   Nocturia 2   Back to sleep 10mins   Wake time 6-7AM   Naps Daily nap   Work          6/18/2024     5:37 PM   Sleep [facility] ROS    Difficulty breathing through the nose?  No   Sore throat or dry mouth in the morning? Sometimes   Irregular or very fast heart beat?  No   Shortness of breath?  Sometimes   Acid reflux? No   Body aches and pains?  Yes   Morning headaches? Sometimes   Dizziness? Sometimes   Mood changes?  Sometimes   Do you exercise?  No   Do you feel like moving your legs a lot?  No       Past Medical History:   Diagnosis Date    Elevated PSA, less than 10 ng/ml 12/23/2022     Patient Active Problem List   Diagnosis    MVP (mitral valve prolapse)    Cyst of finger    Dermatitis    Injury by nail    Right groin pain    Left-sided chest pain    Chronic thumb pain, left    Family history of prostate cancer    Overweight (BMI 25.0-29.9)    Mixed hyperlipidemia    Zenia cardiac risk <10% in next 10 years    Ganglion cyst of dorsum of right wrist    Snoring    Witnessed episode of apnea       Current Outpatient Medications:     atorvastatin (LIPITOR) 20 MG tablet, Take 1 tablet (20 mg total) by mouth once daily., Disp: 90 tablet, Rfl: 3    clobetasol 0.05% (TEMOVATE) 0.05 % Oint, Apply topically 2 (two) times daily., Disp: 60 g, Rfl: 1    scopolamine (TRANSDERM-SCOP) 1.3-1.5 mg (1 mg over 3 days), Place 1 patch  "onto the skin every 72 hours., Disp: 10 patch, Rfl: 0     There were no vitals filed for this visit.    Physical Exam:    GEN:   Well-appearing  Psych:  Appropriate affect, demonstrates insight  SKIN:  No rash on the face or bridge of the nose      LABS:   No results found for: "HGB", "CO2"      RECORDS REVIEWED:    HST 7/2/24: AHI 73    Previous sleep notes: 6/18/24    CPAP interrogation 10/21/24: 30/30 x 5hrs 52mins, 6-12cwp (6.7/8.6/9.6), leak (0.2/7.8/17.2), AHI 4    ASSESSMENT    PROBLEM DESCRIPTION/ Sx on Presentation Interval Hx STATUS   Hx OSCAR   + snoring, + occasional snoring, + witnessed apneas per wife  + wakes feeling un-refreshed   Using mouth guard with some improvements in daytime dozing and snoring Good usage and efficiency     Daytime Sx   + sleepiness when inactive   Daily nap, frequent accidental dozing  Denies drowsiness when driving   ESS 7/24 on intake (reviewed from 6/18/24) Feels more rested and less sleepy on CPAP    Insomnia   Trouble falling asleep: no issues  Arousals:         2 x nightly for nocturia  Hard to get back to sleep?: no issues    Prior pertinent medications:  Current pertinent medications:  Sleep is more consolidated and restorative on CPAP    Nocturia   x 2 per sleep period 1-2 x nightly    Other issues:       PLAN      -using and benefiting from CPAP therapy  -continue CPAP nightly  -CPAP supplies ordered  -discussed OSCAR and PAP with patient in detail, including possible complications of untreated OSCAR like heart attack/stroke  -advised on strict driving precautions; advised never to drive drowsy  -discussed goal of total sleep time for adults is 7-9hours nightly for long term health/brain health  -recommended working on increasing total sleep time    Advised on plan of care. Answered all patient questions. Patient verbalized understanding and voiced agreement with plan of care.     RTC 12 months or as needed      The patient was given open opportunity to ask questions " and/or express concerns about treatment plan. All questions/concerns were discussed.     Two patient identifiers used prior to evaluation.

## 2025-02-05 ENCOUNTER — PATIENT MESSAGE (OUTPATIENT)
Dept: INTERNAL MEDICINE | Facility: CLINIC | Age: 50
End: 2025-02-05

## 2025-02-05 ENCOUNTER — HOSPITAL ENCOUNTER (OUTPATIENT)
Dept: RADIOLOGY | Facility: HOSPITAL | Age: 50
Discharge: HOME | End: 2025-02-05
Attending: FAMILY MEDICINE
Payer: COMMERCIAL

## 2025-02-05 ENCOUNTER — OFFICE VISIT (OUTPATIENT)
Dept: INTERNAL MEDICINE | Facility: CLINIC | Age: 50
End: 2025-02-05
Payer: COMMERCIAL

## 2025-02-05 VITALS
HEIGHT: 71 IN | WEIGHT: 202.81 LBS | OXYGEN SATURATION: 98 % | SYSTOLIC BLOOD PRESSURE: 120 MMHG | HEART RATE: 83 BPM | DIASTOLIC BLOOD PRESSURE: 84 MMHG | BODY MASS INDEX: 28.39 KG/M2

## 2025-02-05 DIAGNOSIS — Z12.5 PROSTATE CANCER SCREENING: ICD-10-CM

## 2025-02-05 DIAGNOSIS — E78.2 MIXED HYPERLIPIDEMIA: ICD-10-CM

## 2025-02-05 DIAGNOSIS — Z13.6 ENCOUNTER FOR SCREENING FOR CARDIOVASCULAR DISORDERS: ICD-10-CM

## 2025-02-05 DIAGNOSIS — M25.551 CHRONIC RIGHT HIP PAIN: ICD-10-CM

## 2025-02-05 DIAGNOSIS — M67.431 GANGLION CYST OF DORSUM OF RIGHT WRIST: ICD-10-CM

## 2025-02-05 DIAGNOSIS — Z00.00 ANNUAL PHYSICAL EXAM: Primary | ICD-10-CM

## 2025-02-05 DIAGNOSIS — G47.33 OSA ON CPAP: ICD-10-CM

## 2025-02-05 DIAGNOSIS — G89.29 CHRONIC RIGHT HIP PAIN: ICD-10-CM

## 2025-02-05 DIAGNOSIS — M79.671 RIGHT FOOT PAIN: ICD-10-CM

## 2025-02-05 PROCEDURE — 75571 CT HRT W/O DYE W/CA TEST: CPT | Mod: 26,,, | Performed by: RADIOLOGY

## 2025-02-05 PROCEDURE — 73630 X-RAY EXAM OF FOOT: CPT | Mod: TC,RT

## 2025-02-05 PROCEDURE — 99999 PR PBB SHADOW E&M-EST. PATIENT-LVL IV: CPT | Mod: PBBFAC,,, | Performed by: FAMILY MEDICINE

## 2025-02-05 PROCEDURE — 75571 CT HRT W/O DYE W/CA TEST: CPT | Mod: TC

## 2025-02-05 PROCEDURE — 73630 X-RAY EXAM OF FOOT: CPT | Mod: 26,RT,, | Performed by: RADIOLOGY

## 2025-02-05 PROCEDURE — 73502 X-RAY EXAM HIP UNI 2-3 VIEWS: CPT | Mod: 26,RT,, | Performed by: RADIOLOGY

## 2025-02-05 PROCEDURE — 99396 PREV VISIT EST AGE 40-64: CPT | Mod: S$GLB,,, | Performed by: FAMILY MEDICINE

## 2025-02-05 PROCEDURE — 73502 X-RAY EXAM HIP UNI 2-3 VIEWS: CPT | Mod: TC,RT

## 2025-02-05 NOTE — PROGRESS NOTES
Subjective:       Patient ID: Josh Jimenez Jr. is a 49 y.o. male.    Chief Complaint: Hip Pain, foot pain, and Annual Exam    Hip Pain       Josh Jimenez Jr. is a 49 y.o. male for checkup.     Right hip pain    Right wrist cyst getting bigger     #Cards: HLD  - est w/ Dr. Romero / Dr. Solomon, lv 8/2023  - does not tolerate lipitor (dizziness)  - stress echo 8/2023, wnl  - carotid u/s 5/2024     #Ortho: Lt thumb pain  - est w/ Dr. Baires, lv 1/2023    #Sleep med: OSCAR on cpap  - est w/ SHARON Vuong, lv 10/2024  - sleep study 7/2024     #BMI 28  - rides bike, does pushups    Review of Systems   Constitutional:  Negative for activity change and unexpected weight change.   HENT:  Negative for hearing loss, rhinorrhea and trouble swallowing.    Eyes:  Positive for visual disturbance. Negative for discharge.   Respiratory:  Negative for chest tightness and wheezing.    Cardiovascular:  Negative for chest pain and palpitations.   Gastrointestinal:  Negative for blood in stool, constipation, diarrhea and vomiting.   Endocrine: Negative for polydipsia and polyuria.   Genitourinary:  Negative for difficulty urinating, hematuria and urgency.   Musculoskeletal:  Positive for arthralgias. Negative for joint swelling and neck pain.   Neurological:  Negative for weakness and headaches.   Psychiatric/Behavioral:  Negative for confusion and dysphoric mood.          Past Medical History:   Diagnosis Date    Elevated PSA, less than 10 ng/ml 12/23/2022        Prior to Admission medications    Medication Sig Start Date End Date Taking? Authorizing Provider   clobetasol 0.05% (TEMOVATE) 0.05 % Oint Apply topically 2 (two) times daily. 4/8/24  Yes Rakesh Dent MD   atorvastatin (LIPITOR) 20 MG tablet Take 1 tablet (20 mg total) by mouth once daily. 5/16/24 5/16/25  Rakesh Dent MD   scopolamine (TRANSDERM-SCOP) 1.3-1.5 mg (1 mg over 3 days) Place 1 patch onto the skin every 72 hours. 5/2/24   Rakesh Dent MD     "    Past medical history, surgical history, and family medical history reviewed and updated as appropriate.    Medications and allergies reviewed.     Objective:          Vitals:    02/05/25 1115   BP: 120/84   BP Location: Left arm   Patient Position: Sitting   Pulse: 83   SpO2: 98%   Weight: 92 kg (202 lb 13.2 oz)   Height: 5' 10.5" (1.791 m)     Body mass index is 28.69 kg/m².  Physical Exam  Vitals and nursing note reviewed.   Constitutional:       General: He is not in acute distress.     Appearance: Normal appearance. He is well-developed.   Eyes:      Extraocular Movements: Extraocular movements intact.   Cardiovascular:      Rate and Rhythm: Normal rate and regular rhythm.      Pulses: Normal pulses.      Heart sounds: Normal heart sounds. No murmur heard.  Pulmonary:      Effort: Pulmonary effort is normal. No respiratory distress.      Breath sounds: Normal breath sounds. No wheezing.   Neurological:      Mental Status: He is alert and oriented to person, place, and time.         Lab Results   Component Value Date    WBC 6.17 12/23/2022    HGB 15.5 12/23/2022    HCT 47.6 12/23/2022     12/23/2022    CHOL 243 (H) 08/21/2023    TRIG 142 08/21/2023    HDL 34 (L) 08/21/2023    ALT 21 12/23/2022    AST 15 12/23/2022     12/23/2022    K 4.3 12/23/2022     12/23/2022    CREATININE 1.1 12/23/2022    BUN 16 12/23/2022    CO2 28 12/23/2022    TSH 0.755 12/23/2022    PSA 3.7 02/08/2023    HGBA1C 5.3 12/23/2022       Assessment:       1. Annual physical exam    2. Prostate cancer screening    3. Chronic right hip pain    4. Encounter for screening for cardiovascular disorders    5. Mixed hyperlipidemia    6. Ganglion cyst of dorsum of right wrist    7. OSCAR on CPAP          Plan:   1. Annual physical exam  -     Comprehensive Metabolic Panel; Future; Expected date: 02/05/2025  -     CBC Without Differential; Future; Expected date: 02/05/2025  -     Lipid Panel; Future; Expected date: 02/05/2025  -    "  Hemoglobin A1C; Future; Expected date: 02/05/2025  -     TSH; Future; Expected date: 02/05/2025  -     PSA, Screening; Future; Expected date: 02/05/2025  -     Urinalysis; Future; Expected date: 02/05/2025    2. Prostate cancer screening  -     PSA, Screening; Future; Expected date: 02/05/2025    3. Chronic right hip pain    4. Encounter for screening for cardiovascular disorders  -     CT Cardiac Scoring; Future; Expected date: 02/05/2025    5. Mixed hyperlipidemia  Overview:  Hold lipitor 40 4/2024  - restart lipitor but at 20mg dose 5/2024, keep in touch about any side effects    Orders:  -     CT Cardiac Scoring; Future; Expected date: 02/05/2025    6. Ganglion cyst of dorsum of right wrist  -     Ambulatory referral/consult to Orthopedics; Future; Expected date: 02/05/2025    7. OSCAR on CPAP        Health maintenance reviewed with patient.     Follow up in about 1 year (around 2/5/2026) for Annual.    As this patient's primary care physician, I am actively managing and/or treating his/her chronic medical conditions now and in the future. This includes, but is not limited to, medication management, coordination of care, documentation review from his/her specialists, and labs/imaging review that I have performed to prepare for this visit as well as will do so in the future as part of my care continuity for this patient.    Rakesh Dent MD  Family Medicine / Primary Care  [facility]  2/5/2025

## 2025-02-06 DIAGNOSIS — R52 PAIN: Primary | ICD-10-CM

## 2025-02-18 ENCOUNTER — TELEPHONE (OUTPATIENT)
Dept: ORTHOPEDICS | Facility: CLINIC | Age: 50
End: 2025-02-18
Payer: COMMERCIAL

## 2025-02-19 ENCOUNTER — HOSPITAL ENCOUNTER (OUTPATIENT)
Dept: RADIOLOGY | Facility: OTHER | Age: 50
Discharge: HOME | End: 2025-02-19
Attending: SPECIALIST/TECHNOLOGIST
Payer: COMMERCIAL

## 2025-02-19 ENCOUNTER — OFFICE VISIT (OUTPATIENT)
Dept: ORTHOPEDICS | Facility: CLINIC | Age: 50
End: 2025-02-19
Payer: COMMERCIAL

## 2025-02-19 DIAGNOSIS — R52 PAIN: ICD-10-CM

## 2025-02-19 DIAGNOSIS — M67.431 GANGLION CYST OF DORSUM OF RIGHT WRIST: ICD-10-CM

## 2025-02-19 PROCEDURE — 73110 X-RAY EXAM OF WRIST: CPT | Mod: TC,FY,RT

## 2025-02-19 RX ORDER — DEXAMETHASONE SODIUM PHOSPHATE 4 MG/ML
4 INJECTION, SOLUTION INTRA-ARTICULAR; INTRALESIONAL; INTRAMUSCULAR; INTRAVENOUS; SOFT TISSUE
Status: DISCONTINUED | OUTPATIENT
Start: 2025-02-19 | End: 2025-02-19 | Stop reason: HOSPADM

## 2025-02-19 RX ADMIN — DEXAMETHASONE SODIUM PHOSPHATE 4 MG: 4 INJECTION, SOLUTION INTRA-ARTICULAR; INTRALESIONAL; INTRAMUSCULAR; INTRAVENOUS; SOFT TISSUE at 10:02

## 2025-02-19 NOTE — PROGRESS NOTES
Patient ID: Josh Jimenez Jr. is a 49 y.o. male.    Chief Complaint: Follow-up of the Right Wrist    History of Present Illness    CHIEF COMPLAINT:  - Cyst on the hand    HPI:  Josh presents with a cyst on his wrist present for approximately 5-8 years. The cyst used to fluctuate in size but now remains consistently enlarged. He states it has become bothersome enough to seek medical attention. He denies numbness or tingling associated with the cyst, mentioning only that he can feel its presence. He is right-handed, which may be relevant to the impact of the cyst on his daily activities.    SOCIAL HISTORY:  - Marital Status:       ROS:  ROS as indicated in HPI.          Hand/Wrist Musculoskeletal Exam    Inspection    Right      Erythema: none      Ecchymosis: none      Edema: none      Deformity: none    Left      Erythema: none      Ecchymosis: none      Edema: none      Deformity: none    Inspection additional comments: Right dorsal mass present that is about the size of a golf ball.    Palpation    Palpation additional comments: The dorsal mass is firm and mobile.    Range of Motion        Range of motion additional comments: Able to make a composite fist.    Neurovascular    Right       Radial pulse: normal      Capillary refill: brisk      Ulnar nerve sensory distribution: normal      Median nerve sensory distribution: normal      Superficial radial nerve sensory distribution: normal    Left       Radial pulse: normal      Capillary refill: brisk      Ulnar nerve sensory distribution: normal      Median nerve sensory distribution: normal      Superficial radial nerve sensory distribution: normal    Neurovascular additional comments:         Physical Exam    Neurological: Normal sensation in hands.  Musculoskeletal: Normal thumb opposition. Normal finger crossing. Normal finger abduction. Normal fist formation and wrist flexion/extension.           IMAGING  Right hand XR  Personal interpretation of the XR  reveals no signs of fractures or dislocations      PLAN  Assessment & Plan    - Discussed two treatment options with the patient: draining the cyst in clinic or surgical excision.  - Draining procedure: ultrasound-guided fluid aspiration and steroid injection.  - Prepared to use cold spray for local anesthesia before drainage.  - Surgical excision: removing cyst stalk, sending to pathology, possible tendon repair if embedded.  - Moderate sedation and nerve block used.  - Josh chose cyst drainage in clinic.  - Use splint for 2 weeks after drainage procedure.  - Begin general range of motion exercises after follow-up visit.  - Follow up in 2 week to check progress after drainage procedure.           PROCEDURE NOTE  Ganglion Cyst: R radiocarpal    Date/Time: 2/19/2025 10:00 AM    Performed by: Alvino Marcelo PA-C  Authorized by: Alvino Marcelo PA-C    Consent Done?:  Yes (Verbal)  Indications:  Pain  Timeout: prior to procedure the correct patient, procedure, and site was verified    Local anesthesia used?: Yes    Local anesthetic:  Lidocaine 1% without epinephrine  Anesthetic total (ml):  1    Location:  Wrist  Site:  R radiocarpal  Ultrasonic Guidance for Needle Placement?: Yes (Dynamic visualization was maintained of the needle for the entirety of the procedure.  Appropriate images were saved in the patient's media portion of the chart)    Needle size:  18 G (25 G for injection of steroid)  Approach:  Dorsal  Medications:  4 mg dexAMETHasone 4 mg/mL  Aspirate amount (ml):  3  Aspirate:  Clear (Clear, Jelly-like, thick)  Patient tolerance:  Patient tolerated the procedure well with no immediate complications        Hilton Marcelo PA-C, ATC  Hand and Upper Extremity   [facility]    This note was generated with the assistance of ambient listening technology. Verbal consent was obtained by the patient and accompanying visitor(s) for the recording of patient appointment to facilitate this note. I attest to having reviewed  and edited the generated note for accuracy, though some syntax or spelling errors may persist. Please contact the author of this note for any clarification.

## 2025-03-11 ENCOUNTER — OFFICE VISIT (OUTPATIENT)
Dept: ORTHOPEDICS | Facility: CLINIC | Age: 50
End: 2025-03-11
Payer: COMMERCIAL

## 2025-03-11 VITALS — WEIGHT: 202.81 LBS | HEIGHT: 71 IN | BODY MASS INDEX: 28.39 KG/M2

## 2025-03-11 DIAGNOSIS — M67.431 GANGLION CYST OF DORSUM OF RIGHT WRIST: Primary | ICD-10-CM

## 2025-03-11 PROCEDURE — 99999 PR PBB SHADOW E&M-EST. PATIENT-LVL III: CPT | Mod: PBBFAC,,, | Performed by: SPECIALIST/TECHNOLOGIST

## 2025-03-11 PROCEDURE — 99214 OFFICE O/P EST MOD 30 MIN: CPT | Mod: S$GLB,,, | Performed by: SPECIALIST/TECHNOLOGIST

## 2025-03-11 NOTE — PROGRESS NOTES
"Patient ID: Josh Jimenez Jr. is a 49 y.o. male.    Chief Complaint: Swelling of the Left Wrist    History of Present Illness    CHIEF COMPLAINT:  - Ganglion cyst on right dorsal wrist    HPI:  Josh presents for a follow-up of a ganglion cyst on the right dorsal wrist. The cyst was previously drained, yielding 3 mL of fluid, and a steroid was injected. However, the cyst has persisted, with the patient stating that it remained the same after the procedure. Josh expresses dissatisfaction with the cyst's presence, indicating that it is causing discomfort or concern. The cyst is described as "pretty well walled off" and "a big one," suggesting noticeable swelling or discomfort on the wrist. After discussing treatment options, the patient has decided to proceed with surgical excision of the cyst.    Josh denies having heart disease, childhood disorder, diabetes, or pacemakers.    PREVIOUS TREATMENTS:  - Practitioner attempted draining the ganglion cyst, obtaining 3 mL of fluid  - Steroid injection was administered into the cyst following drainage    SURGICAL HISTORY:  - Upcoming surgery: Removal of ganglion cyst from right dorsal wrist under moderate sedation. To be performed by Dr. Bhaskar Camejo in a few weeks.      ROS:  ROS as indicated in HPI.          Hand/Wrist Musculoskeletal Exam    Inspection    Right      Erythema: none      Ecchymosis: none      Edema: none      Deformity: none    Left      Erythema: none      Ecchymosis: none      Edema: none      Deformity: none    Inspection additional comments: Right dorsal mass present that is about the size of a golf ball.    Palpation    Palpation additional comments: The dorsal mass is firm and mobile.    Range of Motion        Range of motion additional comments: Able to make a composite fist.    Neurovascular    Right       Radial pulse: normal      Capillary refill: brisk      Ulnar nerve sensory distribution: normal      Median nerve sensory distribution: normal      " Superficial radial nerve sensory distribution: normal    Left       Radial pulse: normal      Capillary refill: brisk      Ulnar nerve sensory distribution: normal      Median nerve sensory distribution: normal      Superficial radial nerve sensory distribution: normal    Neurovascular additional comments:         Physical Exam    MSK: Hand/Wrist - Right: Right dorsal wrist lump.           IMAGING  Right hand XR  Personal interpretation of the XR reveals no signs of fractures or dislocations      PLAN  Assessment & Plan    49-year-old with right dorsal wrist mass.  He has failed needle aspiration in clinic.  He is interested in surgical excision of the mass.  Discussed the nature of right dorsal wrist ganglion excision surgery.  Reviewed postoperative care.  Inform patient no lifting pushing or pulling 2 weeks postoperatively.  Patient is to keep the dressing on for 2 weeks' post operatively.  If dressing gets dirty, wet, or irritated, patient will f/u in clinic for a dressing change. She will follow up 2 weeks postoperatively for suture removal.  We have discussed risks of hand surgery which include but are not limited to  blood clots in the legs that can travel to the lungs (pulmonary embolism). Pulmonary embolism can cause shortness of breath, chest pain, and even shock. Other risks include urinary tract infection, nausea and vomiting (usually related to pain medication), chronic pain, nerve damage, blood vessel injury, and infection of the hand which can require re-operation. Furthermore, the risks of anesthesia include potential heart, lung, kidney, and liver damage.  Patient  understands and is ready for the procedure.   Offered prior appointment with conducting surgeon. Patient denied and would like to proceed with surgery as planned.   Consents obtained in clinic.              Hilton Marcelo PA-C, ATC  Hand and Upper Extremity   [facility]    This note was generated with the assistance of ambient listening  technology. Verbal consent was obtained by the patient and accompanying visitor(s) for the recording of patient appointment to facilitate this note. I attest to having reviewed and edited the generated note for accuracy, though some syntax or spelling errors may persist. Please contact the author of this note for any clarification.

## 2025-03-18 DIAGNOSIS — M67.431 GANGLION CYST OF DORSUM OF RIGHT WRIST: Primary | ICD-10-CM

## 2025-03-18 DIAGNOSIS — R52 PAIN: ICD-10-CM

## 2025-04-01 ENCOUNTER — PATIENT MESSAGE (OUTPATIENT)
Dept: ORTHOPEDICS | Facility: CLINIC | Age: 50
End: 2025-04-01
Payer: COMMERCIAL

## 2025-04-02 ENCOUNTER — PATIENT MESSAGE (OUTPATIENT)
Dept: PREADMISSION TESTING | Facility: HOSPITAL | Age: 50
End: 2025-04-02
Payer: COMMERCIAL

## 2025-04-03 ENCOUNTER — DOCUMENTATION ONLY (OUTPATIENT)
Dept: ORTHOPEDICS | Facility: CLINIC | Age: 50
End: 2025-04-03
Payer: COMMERCIAL

## 2025-04-21 NOTE — TELEPHONE ENCOUNTER
No care due was identified.  [facility] Embedded Care Due Messages. Reference number: [id number].   4/21/2025 12:55:02 PM CDT

## 2025-04-22 RX ORDER — CLOBETASOL PROPIONATE 0.5 MG/G
1 OINTMENT TOPICAL 2 TIMES DAILY
Qty: 60 G | Refills: 0 | Status: SHIPPED | OUTPATIENT
Start: 2025-04-22

## 2025-04-22 NOTE — TELEPHONE ENCOUNTER
Refill Decision Note   Josh Mariannelorraine  is requesting a refill authorization.  Brief Assessment and Rationale for Refill:  Approve     Medication Therapy Plan:         Comments:     Note composed:8:27 AM 04/22/2025

## 2025-07-21 ENCOUNTER — PATIENT MESSAGE (OUTPATIENT)
Dept: ADMINISTRATIVE | Facility: HOSPITAL | Age: 50
End: 2025-07-21
Payer: COMMERCIAL

## 2025-08-21 ENCOUNTER — HOSPITAL ENCOUNTER (OUTPATIENT)
Dept: RADIOLOGY | Facility: HOSPITAL | Age: 50
Discharge: HOME | End: 2025-08-21
Attending: NURSE PRACTITIONER
Payer: COMMERCIAL

## 2025-08-21 ENCOUNTER — OFFICE VISIT (OUTPATIENT)
Dept: ORTHOPEDICS | Facility: CLINIC | Age: 50
End: 2025-08-21
Payer: COMMERCIAL

## 2025-08-21 ENCOUNTER — OFFICE VISIT (OUTPATIENT)
Dept: INTERNAL MEDICINE | Facility: CLINIC | Age: 50
End: 2025-08-21
Payer: COMMERCIAL

## 2025-08-21 VITALS
HEIGHT: 71 IN | BODY MASS INDEX: 27.4 KG/M2 | DIASTOLIC BLOOD PRESSURE: 86 MMHG | SYSTOLIC BLOOD PRESSURE: 118 MMHG | HEART RATE: 67 BPM | WEIGHT: 195.75 LBS | OXYGEN SATURATION: 98 %

## 2025-08-21 VITALS — HEIGHT: 70 IN | BODY MASS INDEX: 27.77 KG/M2 | WEIGHT: 194 LBS

## 2025-08-21 DIAGNOSIS — Z79.899 DRUG THERAPY: ICD-10-CM

## 2025-08-21 DIAGNOSIS — G47.33 OSA ON CPAP: ICD-10-CM

## 2025-08-21 DIAGNOSIS — M79.10 MYALGIA: ICD-10-CM

## 2025-08-21 DIAGNOSIS — M54.50 DORSALGIA OF LUMBAR REGION: Primary | ICD-10-CM

## 2025-08-21 DIAGNOSIS — M16.11 OSTEOARTHRITIS OF RIGHT HIP, UNSPECIFIED OSTEOARTHRITIS TYPE: Primary | ICD-10-CM

## 2025-08-21 DIAGNOSIS — M25.551 RIGHT HIP PAIN: ICD-10-CM

## 2025-08-21 DIAGNOSIS — R97.20 ELEVATED PSA, LESS THAN 10 NG/ML: ICD-10-CM

## 2025-08-21 DIAGNOSIS — Z78.9 STATIN INTOLERANCE: ICD-10-CM

## 2025-08-21 DIAGNOSIS — E78.2 MIXED HYPERLIPIDEMIA: ICD-10-CM

## 2025-08-21 PROCEDURE — 99214 OFFICE O/P EST MOD 30 MIN: CPT | Mod: S$GLB,,, | Performed by: NURSE PRACTITIONER

## 2025-08-21 PROCEDURE — 73502 X-RAY EXAM HIP UNI 2-3 VIEWS: CPT | Mod: TC,RT

## 2025-08-21 PROCEDURE — 99999 PR PBB SHADOW E&M-EST. PATIENT-LVL III: CPT | Mod: PBBFAC,,, | Performed by: NURSE PRACTITIONER

## 2025-08-21 PROCEDURE — G2211 COMPLEX E/M VISIT ADD ON: HCPCS | Mod: S$GLB,,, | Performed by: FAMILY MEDICINE

## 2025-08-21 PROCEDURE — 99214 OFFICE O/P EST MOD 30 MIN: CPT | Mod: S$GLB,,, | Performed by: FAMILY MEDICINE

## 2025-08-21 PROCEDURE — 99999 PR PBB SHADOW E&M-EST. PATIENT-LVL IV: CPT | Mod: PBBFAC,,, | Performed by: FAMILY MEDICINE

## 2025-08-21 PROCEDURE — 73502 X-RAY EXAM HIP UNI 2-3 VIEWS: CPT | Mod: 26,RT,, | Performed by: RADIOLOGY

## 2025-08-21 RX ORDER — METHYLPREDNISOLONE 4 MG/1
TABLET ORAL
Qty: 1 EACH | Refills: 0 | Status: SHIPPED | OUTPATIENT
Start: 2025-08-21 | End: 2025-09-11

## 2025-08-25 PROBLEM — Z79.899 DRUG THERAPY: Status: RESOLVED | Noted: 2025-08-21 | Resolved: 2025-08-25

## 2025-08-25 PROBLEM — Z78.9 STATIN INTOLERANCE: Status: RESOLVED | Noted: 2025-08-21 | Resolved: 2025-08-25

## 2025-08-31 RX ORDER — CLOBETASOL PROPIONATE 0.5 MG/G
1 OINTMENT TOPICAL 2 TIMES DAILY
Qty: 60 G | Refills: 0 | Status: SHIPPED | OUTPATIENT
Start: 2025-08-31